# Patient Record
Sex: FEMALE | Race: WHITE | NOT HISPANIC OR LATINO | Employment: FULL TIME | URBAN - METROPOLITAN AREA
[De-identification: names, ages, dates, MRNs, and addresses within clinical notes are randomized per-mention and may not be internally consistent; named-entity substitution may affect disease eponyms.]

---

## 2019-10-16 LAB
HCT VFR BLD AUTO: 41 % (ref 36–46)
HGB BLD-MCNC: 13 G/DL (ref 12–16)
MCV RBC AUTO: 96 FL (ref 82–108)
PLATELET # BLD AUTO: 266 K/?L (ref 150–399)
TSH SERPL DL<=0.005 MIU/L-ACNC: 0.86 UIU/ML (ref 0.41–5.9)

## 2019-10-17 LAB
HBV SURFACE AG SERPL QL IA: NEGATIVE
RPR: NORMAL
RUBELLA IMMUNE STATUS: NORMAL

## 2019-10-18 LAB — HIV-1 AND HIV-2 ANTIBODIES: NORMAL

## 2019-10-21 LAB
C TRACH RRNA SPEC QL PROBE: NEGATIVE
N GONORRHOEAE, AMPLIFIED DNA: NEGATIVE

## 2019-11-21 ENCOUNTER — INITIAL PRENATAL (OUTPATIENT)
Dept: OBSTETRICS AND GYNECOLOGY | Facility: CLINIC | Age: 24
End: 2019-11-21
Payer: COMMERCIAL

## 2019-11-21 ENCOUNTER — TELEPHONE (OUTPATIENT)
Dept: MATERNAL FETAL MEDICINE | Facility: CLINIC | Age: 24
End: 2019-11-21

## 2019-11-21 VITALS — SYSTOLIC BLOOD PRESSURE: 116 MMHG | WEIGHT: 130.06 LBS | DIASTOLIC BLOOD PRESSURE: 64 MMHG

## 2019-11-21 DIAGNOSIS — Z34.90 PREGNANT AND NOT YET DELIVERED, UNSPECIFIED TRIMESTER: Primary | ICD-10-CM

## 2019-11-21 PROCEDURE — 99999 PR PBB SHADOW E&M-NEW PATIENT-LVL II: ICD-10-PCS | Mod: PBBFAC,,, | Performed by: ADVANCED PRACTICE MIDWIFE

## 2019-11-21 PROCEDURE — 0500F PR INITIAL PRENATAL CARE VISIT: ICD-10-PCS | Mod: S$GLB,,, | Performed by: ADVANCED PRACTICE MIDWIFE

## 2019-11-21 PROCEDURE — 87086 URINE CULTURE/COLONY COUNT: CPT

## 2019-11-21 PROCEDURE — 0500F INITIAL PRENATAL CARE VISIT: CPT | Mod: S$GLB,,, | Performed by: ADVANCED PRACTICE MIDWIFE

## 2019-11-21 PROCEDURE — 99999 PR PBB SHADOW E&M-NEW PATIENT-LVL II: CPT | Mod: PBBFAC,,, | Performed by: ADVANCED PRACTICE MIDWIFE

## 2019-11-21 RX ORDER — TERCONAZOLE 8 MG/G
CREAM VAGINAL
COMMUNITY
Start: 2019-10-24 | End: 2020-01-14 | Stop reason: ALTCHOICE

## 2019-11-21 NOTE — PROGRESS NOTES
LIZETTEM offered pt emergency NT @ 0840 tomorrow morning but pt unable to keep appt. She'd prefer Quad screen instead and knows it's optimally drawn from 16-18 weeks. Ordered.     Cinthya

## 2019-11-21 NOTE — TELEPHONE ENCOUNTER
Nedra with ABC came to [facility] reporting a NT ordered has just been placed for a pt who is 13 weeks 2 days. Schedule full. Reviewed information with Dr Velazquez who reports pt can be added tomorrow morning and recommends provider off. Called [facility], staff offered pt appt tomorrow morning 8:40 am. Pt declined. Order removed

## 2019-11-21 NOTE — PROGRESS NOTES
No chief complaint on file.      24 y.o.,  at 13w1d by LMP and dating scan according to pt report (records requested).     Patient presents today for a transfer initial prenatal visit. She reports she has been receiving regular, routine prenatal care at [facility].  Patient has not completed a Meet & Greet tour of [facility].  She is here today with her mother.    Doing well overall.     SOCIAL HISTORY: Denies emotional/mental/physical/sexual violence or abuse. Feels safe at home.     Please ask about prepregnancy weight at next visit.       ROS  OBSTETRICS:   Contractions No   Bleeding No   Loss of fluid No   Fetal mvmnt:   +    GASTRO:   Nausea No   Vomiting No      OB History    Para Term  AB Living   1             SAB TAB Ectopic Multiple Live Births                  # Outcome Date GA Lbr Frank/2nd Weight Sex Delivery Anes PTL Lv   1 Current                Dating reviewed  Allergies and problem list reviewed and updated  Medical and surgical history reviewed    PHYSICAL EXAM  /64   Wt 59 kg (130 lb 1.1 oz)     GENERAL: No acute distress  HEENT: Normocephalic, atruamatic  NEURO: Alert and oriented x3  PSYCH: Calm, normal mood and affect  PULMONARY: Non-labored respiration; no tachypnea  ABD: Soft, gravid, nontender    ASSESSMENT AND PLAN    ARIADNA MATUTE Problems (from 19 to present)     No problems associated with this episode.            Patient does not have copy of prenatal records here with her today. Records request completed, will fax today.  Awaiting initial labs and dating u/s.  Counseled today on proper weight gain based on the [place] of [employer]'s recommendations based on her pre-pregnancy weight. Discussed excessive weight gain allowance, which would risk her out of the ABC (and would plan for delivery on L&D), but not midwifery care. Reviewed potential risks to excessive weight gain.  .BMI (prepregnancy) Normal weight -   -- Discussed IOM recommended weight gain  of:   Weight category Pre pre BMI  Recommended TWG   Underweight Less than 18.5 28-40    Normal Weight 18.5-24.9  25-35    Overweight 25-29.9  15-25    Obese   30 and greater  11-20   -- Discussed criteria for delivery at [facility] r/t excessive pre-preg weight or excessive weight gain:   Pre-pregnancy BMI over 40 or excess pregnancy weight gain defined as:   Pre-preg BMI < 18.5; Excess weight gain = > 60 pound   Pre-preg BMI 18.5-24.9;  Excess weight gain = > 53 pounds   Pre-preg BMI 25-29.9;  Excess weight gain = > 38 pounds   Pre-preg BMI > 30;  Excess weight gain = > 30 pounds    Review exercise precautions in pregnancy, along with recommendations. She does exercise regularly.   Discussed substances & foods to avoid in pregnancy (i.e. sushi, fish that are high in mercury, deli meat, and unpasteurized cheeses).   Discussed prenatal vitamin options (i.e. stool softener, DHA).    Patient was oriented to practice and progression of routine prenatal care.   Reviewed New OB Pregnancy packet & questions answered.    Reviewed aneuploidy screening options and indications, questions answered - patient desires Sequential Screen. Went to [facility] to request stat booking of NT - awaiting phone call and Tootie will call and give pt appt. Please ask about [place] screening at next visit.   Education regarding warning signs.      Follow up: 4 wks, call or present sooner prn.

## 2019-11-22 LAB — BACTERIA UR CULT: NO GROWTH

## 2019-11-26 ENCOUNTER — TELEPHONE (OUTPATIENT)
Dept: OBSTETRICS AND GYNECOLOGY | Facility: CLINIC | Age: 24
End: 2019-11-26

## 2019-11-26 NOTE — TELEPHONE ENCOUNTER
Returned pt call to reschedule appointment reached  left pt message advising to return call to clinic           ----- Message from Aileen Amor sent at 11/26/2019 12:39 PM CST -----  Contact: ARIADNA MATUTE [[id number]]  Name of Who is Calling: ARIADNA MATUTE [[id number]]      What is the request in detail:   Patient called requesting to reschedule her appointment. I did attempt to reschedule per request but was unsuccessful.   Please give a call back at your earliest convenience and further advise.     THANKS!      Reply by [facility]: NO      Call Back: ARIADNA MATUTE / # [phone]

## 2019-12-17 ENCOUNTER — ROUTINE PRENATAL (OUTPATIENT)
Dept: OBSTETRICS AND GYNECOLOGY | Facility: CLINIC | Age: 24
End: 2019-12-17
Payer: COMMERCIAL

## 2019-12-17 VITALS
DIASTOLIC BLOOD PRESSURE: 64 MMHG | BODY MASS INDEX: 20.99 KG/M2 | SYSTOLIC BLOOD PRESSURE: 106 MMHG | HEIGHT: 66 IN | WEIGHT: 130.63 LBS

## 2019-12-17 DIAGNOSIS — Z13.9 RISK AND FUNCTIONAL ASSESSMENT: ICD-10-CM

## 2019-12-17 DIAGNOSIS — Z34.90 PREGNANCY, UNSPECIFIED GESTATIONAL AGE: Primary | ICD-10-CM

## 2019-12-17 PROCEDURE — 99999 PR PBB SHADOW E&M-EST. PATIENT-LVL III: CPT | Mod: PBBFAC,,, | Performed by: ADVANCED PRACTICE MIDWIFE

## 2019-12-17 PROCEDURE — 0502F PR SUBSEQUENT PRENATAL CARE: ICD-10-PCS | Mod: CPTII,S$GLB,, | Performed by: ADVANCED PRACTICE MIDWIFE

## 2019-12-17 PROCEDURE — 99999 PR PBB SHADOW E&M-EST. PATIENT-LVL III: ICD-10-PCS | Mod: PBBFAC,,, | Performed by: ADVANCED PRACTICE MIDWIFE

## 2019-12-17 PROCEDURE — 0502F SUBSEQUENT PRENATAL CARE: CPT | Mod: CPTII,S$GLB,, | Performed by: ADVANCED PRACTICE MIDWIFE

## 2019-12-23 ENCOUNTER — DOCUMENTATION ONLY (OUTPATIENT)
Dept: OBSTETRICS AND GYNECOLOGY | Facility: CLINIC | Age: 24
End: 2019-12-23

## 2019-12-23 LAB
HCV AB SER-ACNC: NEGATIVE
PAP SMEAR: ABNORMAL

## 2020-01-08 ENCOUNTER — OFFICE VISIT (OUTPATIENT)
Dept: OBSTETRICS AND GYNECOLOGY | Facility: CLINIC | Age: 25
End: 2020-01-08
Payer: COMMERCIAL

## 2020-01-08 VITALS
WEIGHT: 134.06 LBS | HEIGHT: 66 IN | SYSTOLIC BLOOD PRESSURE: 118 MMHG | BODY MASS INDEX: 21.55 KG/M2 | DIASTOLIC BLOOD PRESSURE: 72 MMHG

## 2020-01-08 DIAGNOSIS — R87.612 LGSIL ON PAP SMEAR OF CERVIX: ICD-10-CM

## 2020-01-08 PROCEDURE — 99499 UNLISTED E&M SERVICE: CPT | Mod: S$GLB,,, | Performed by: OBSTETRICS & GYNECOLOGY

## 2020-01-08 PROCEDURE — 99999 PR PBB SHADOW E&M-EST. PATIENT-LVL III: ICD-10-PCS | Mod: PBBFAC,,, | Performed by: OBSTETRICS & GYNECOLOGY

## 2020-01-08 PROCEDURE — 57452 COLPOSCOPY: ICD-10-PCS | Mod: S$GLB,,, | Performed by: OBSTETRICS & GYNECOLOGY

## 2020-01-08 PROCEDURE — 99499 NO LOS: ICD-10-PCS | Mod: S$GLB,,, | Performed by: OBSTETRICS & GYNECOLOGY

## 2020-01-08 PROCEDURE — 99999 PR PBB SHADOW E&M-EST. PATIENT-LVL III: CPT | Mod: PBBFAC,,, | Performed by: OBSTETRICS & GYNECOLOGY

## 2020-01-08 PROCEDURE — 57452 EXAM OF CERVIX W/SCOPE: CPT | Mod: S$GLB,,, | Performed by: OBSTETRICS & GYNECOLOGY

## 2020-01-08 NOTE — PROCEDURES
Colposcopy  Date/Time: 1/8/2020 3:44 PM  Performed by: Abisai Streeter Jr., MD  Authorized by: Abisai Streeter Jr., MD     Consent Done?:  Yes (Written)  Assistants?: No      Colposcopy Site:  Cervix  Position:  Supine  Acrowhite Lesion: No    Atypical Vessels: No    Transformation Zone Adequate?: Yes    Biopsy?: No    ECC Performed?: No    LEEP Performed?: No    Estimated blood loss (cc):  0   Patient tolerated the procedure well with no immediate complications.

## 2020-01-09 ENCOUNTER — PROCEDURE VISIT (OUTPATIENT)
Dept: MATERNAL FETAL MEDICINE | Facility: CLINIC | Age: 25
End: 2020-01-09
Payer: COMMERCIAL

## 2020-01-09 DIAGNOSIS — Z36.89 ENCOUNTER FOR FETAL ANATOMIC SURVEY: ICD-10-CM

## 2020-01-09 DIAGNOSIS — Z34.90 PREGNANCY, UNSPECIFIED GESTATIONAL AGE: ICD-10-CM

## 2020-01-09 PROCEDURE — 76805 OB US >/= 14 WKS SNGL FETUS: CPT | Mod: S$GLB,,, | Performed by: OBSTETRICS & GYNECOLOGY

## 2020-01-09 PROCEDURE — 76805 PR US, OB 14+WKS, TRANSABD, SINGLE GESTATION: ICD-10-PCS | Mod: S$GLB,,, | Performed by: OBSTETRICS & GYNECOLOGY

## 2020-01-14 ENCOUNTER — ROUTINE PRENATAL (OUTPATIENT)
Dept: OBSTETRICS AND GYNECOLOGY | Facility: CLINIC | Age: 25
End: 2020-01-14
Payer: COMMERCIAL

## 2020-01-14 VITALS — WEIGHT: 130.75 LBS | SYSTOLIC BLOOD PRESSURE: 102 MMHG | BODY MASS INDEX: 21.1 KG/M2 | DIASTOLIC BLOOD PRESSURE: 60 MMHG

## 2020-01-14 DIAGNOSIS — Z13.9 RISK AND FUNCTIONAL ASSESSMENT: Primary | ICD-10-CM

## 2020-01-14 PROCEDURE — 99999 PR PBB SHADOW E&M-EST. PATIENT-LVL II: ICD-10-PCS | Mod: PBBFAC,,, | Performed by: ADVANCED PRACTICE MIDWIFE

## 2020-01-14 PROCEDURE — 0502F SUBSEQUENT PRENATAL CARE: CPT | Mod: CPTII,S$GLB,, | Performed by: ADVANCED PRACTICE MIDWIFE

## 2020-01-14 PROCEDURE — 99999 PR PBB SHADOW E&M-EST. PATIENT-LVL II: CPT | Mod: PBBFAC,,, | Performed by: ADVANCED PRACTICE MIDWIFE

## 2020-01-14 PROCEDURE — 0502F PR SUBSEQUENT PRENATAL CARE: ICD-10-PCS | Mod: CPTII,S$GLB,, | Performed by: ADVANCED PRACTICE MIDWIFE

## 2020-01-14 NOTE — PROGRESS NOTES
24 y.o., at 21w0d by Estimated Date of Delivery: 20    Doing well.    TWG: 3 lbs    BMI   -- Discussed IOM recommended weight gain of:   Normal Weight 18.5-24.9  25-35   -- Discussed criteria for delivery at [facility] r/t excessive pre-preg weight or excessive weight gain:   Pre-pregnancy BMI over 40 or excess pregnancy weight gain defined as:   Pre-preg BMI 18.5-24.9;  Excess weight gain = > 53 pounds      ROS  OBSTETRICS:   Contractions No   Bleeding No   Loss of fluid No   Fetal mvmnt   Yes  GASTRO:   Nausea No   Vomiting No      OB History    Para Term  AB Living   1             SAB TAB Ectopic Multiple Live Births                  # Outcome Date GA Lbr Frank/2nd Weight Sex Delivery Anes PTL Lv   1 Current                Dating reviewed  Allergies and problem list reviewed and updated  Medical and surgical history reviewed  Prenatal labs reviewed and updated    PHYSICAL EXAM  LMP 2019     GENERAL: No acute distress  HEENT: Normocephalic, atraumatic  NEURO: Alert and oriented x3  PSYCH: Normal mood and affect  PULMONARY: Non-labored respiration; no tachypnea  ABD: Soft, gravid, nontender; no hernia or hepatosplenomegaly  FH = umbilicus    ASSESSMENT AND PLAN    ARIADNA MATUTE Problems (from 19 to present)     No problems associated with this episode.            Reviewed anatomy ultrasound    Reviewed wt gain parameters for ABC, along with exercise/diet recommendations in pregnancy.    Encouraged prenatal education classes    Education regarding  labor warning s/s.  Confirmed after-hours number given to patient.    Patient still needs a Type & Screen. Discussed this with patient and she agrees to have this drawn. It is ordered and I discussed with her how she can get this drawn. She also needs a GC to be collected. Please discuss this with patient at next visit.    Awaiting first trimester US results from [facility].    Reviewed warning signs, normal FM, and how/when to call.    Follow-up:  4 weeks, call or present sooner PRN

## 2020-02-11 ENCOUNTER — LAB VISIT (OUTPATIENT)
Dept: LAB | Facility: OTHER | Age: 25
End: 2020-02-11
Attending: ADVANCED PRACTICE MIDWIFE
Payer: COMMERCIAL

## 2020-02-11 ENCOUNTER — ROUTINE PRENATAL (OUTPATIENT)
Dept: OBSTETRICS AND GYNECOLOGY | Facility: CLINIC | Age: 25
End: 2020-02-11
Payer: COMMERCIAL

## 2020-02-11 VITALS
DIASTOLIC BLOOD PRESSURE: 64 MMHG | SYSTOLIC BLOOD PRESSURE: 110 MMHG | WEIGHT: 138.25 LBS | BODY MASS INDEX: 22.31 KG/M2

## 2020-02-11 DIAGNOSIS — Z34.90 PREGNANCY, UNSPECIFIED GESTATIONAL AGE: ICD-10-CM

## 2020-02-11 DIAGNOSIS — Z34.90 PREGNANCY, UNSPECIFIED GESTATIONAL AGE: Primary | ICD-10-CM

## 2020-02-11 LAB
ABO + RH BLD: NORMAL
BLD GP AB SCN CELLS X3 SERPL QL: NORMAL

## 2020-02-11 PROCEDURE — 0502F SUBSEQUENT PRENATAL CARE: CPT | Mod: CPTII,S$GLB,, | Performed by: ADVANCED PRACTICE MIDWIFE

## 2020-02-11 PROCEDURE — 0502F PR SUBSEQUENT PRENATAL CARE: ICD-10-PCS | Mod: CPTII,S$GLB,, | Performed by: ADVANCED PRACTICE MIDWIFE

## 2020-02-11 PROCEDURE — 36415 COLL VENOUS BLD VENIPUNCTURE: CPT

## 2020-02-11 PROCEDURE — 99999 PR PBB SHADOW E&M-EST. PATIENT-LVL II: ICD-10-PCS | Mod: PBBFAC,,, | Performed by: ADVANCED PRACTICE MIDWIFE

## 2020-02-11 PROCEDURE — 99999 PR PBB SHADOW E&M-EST. PATIENT-LVL II: CPT | Mod: PBBFAC,,, | Performed by: ADVANCED PRACTICE MIDWIFE

## 2020-02-11 PROCEDURE — 86901 BLOOD TYPING SEROLOGIC RH(D): CPT

## 2020-02-11 NOTE — PROGRESS NOTES
Chief Complaint   Patient presents with    Routine Prenatal Visit       24 y.o. female  at 25w0d, by Estimated Date of Delivery: 20    Complaints today: Manuel here. Doing well today.   Reviewed TW lbs    ROS  OBSTETRICS:   Contractions No   Bleeding No   Loss of fluid No   Fetal mvmnt yes  GASTRO:   Nausea No   Vomiting No      OB History    Para Term  AB Living   1             SAB TAB Ectopic Multiple Live Births                  # Outcome Date GA Lbr Frank/2nd Weight Sex Delivery Anes PTL Lv   1 Current                Dating reviewed  Allergies and problem list reviewed and updated  Medical and surgical history reviewed  Prenatal labs reviewed and updated    PHYSICAL EXAM  /64   Wt 62.7 kg (138 lb 3.7 oz)   LMP 2019   BMI 22.31 kg/m²     GENERAL: No acute distress  HEENT: Normocephalic, atraumatic  NEURO: Alert and oriented x3  PSYCH: Normal mood and affect  PULMONARY: Non-labored respiration; no tachypnea  ABD: Soft, gravid, nontender      ASSESSMENT AND PLAN    ARIADNA MATUTE Problems (from 19 to present)     No problems associated with this episode.            Reviewed upcoming 28wk labs, and orders placed  Education regarding warning signs of PreEclampsia, reviewed normal FM,  labor precautions, and how/when to call.    Follow-up: 4 weeks, call or present sooner PRN

## 2020-02-27 ENCOUNTER — ROUTINE PRENATAL (OUTPATIENT)
Dept: OBSTETRICS AND GYNECOLOGY | Facility: CLINIC | Age: 25
End: 2020-02-27
Payer: COMMERCIAL

## 2020-02-27 ENCOUNTER — LAB VISIT (OUTPATIENT)
Dept: LAB | Facility: OTHER | Age: 25
End: 2020-02-27
Attending: ADVANCED PRACTICE MIDWIFE
Payer: COMMERCIAL

## 2020-02-27 VITALS
SYSTOLIC BLOOD PRESSURE: 120 MMHG | DIASTOLIC BLOOD PRESSURE: 72 MMHG | WEIGHT: 139.69 LBS | BODY MASS INDEX: 22.54 KG/M2

## 2020-02-27 DIAGNOSIS — Z34.90 PREGNANCY, UNSPECIFIED GESTATIONAL AGE: ICD-10-CM

## 2020-02-27 DIAGNOSIS — Z34.93 PRENATAL CARE IN THIRD TRIMESTER: Primary | ICD-10-CM

## 2020-02-27 LAB — GLUCOSE SERPL-MCNC: 114 MG/DL (ref 70–140)

## 2020-02-27 PROCEDURE — 99999 PR PBB SHADOW E&M-EST. PATIENT-LVL II: CPT | Mod: PBBFAC,,, | Performed by: ADVANCED PRACTICE MIDWIFE

## 2020-02-27 PROCEDURE — 36415 COLL VENOUS BLD VENIPUNCTURE: CPT

## 2020-02-27 PROCEDURE — 0502F SUBSEQUENT PRENATAL CARE: CPT | Mod: CPTII,S$GLB,, | Performed by: ADVANCED PRACTICE MIDWIFE

## 2020-02-27 PROCEDURE — 0502F PR SUBSEQUENT PRENATAL CARE: ICD-10-PCS | Mod: CPTII,S$GLB,, | Performed by: ADVANCED PRACTICE MIDWIFE

## 2020-02-27 PROCEDURE — 82950 GLUCOSE TEST: CPT

## 2020-02-27 PROCEDURE — 99999 PR PBB SHADOW E&M-EST. PATIENT-LVL II: ICD-10-PCS | Mod: PBBFAC,,, | Performed by: ADVANCED PRACTICE MIDWIFE

## 2020-02-27 NOTE — PROGRESS NOTES
No chief complaint on file.      24 y.o. female  at 27w2d, by Estimated Date of Delivery: 20    Complaints today: none. Doing well today.   Reviewed TW lbs    ROS  OBSTETRICS:   Contractions No   Bleeding No   Loss of fluid No   Fetal mvmnt yes  GASTRO:   Nausea No   Vomiting No      OB History    Para Term  AB Living   1             SAB TAB Ectopic Multiple Live Births                  # Outcome Date GA Lbr Frank/2nd Weight Sex Delivery Anes PTL Lv   1 Current                Dating reviewed  Allergies and problem list reviewed and updated  Medical and surgical history reviewed  Prenatal labs reviewed and updated    PHYSICAL EXAM  /72   Wt 63.3 kg (139 lb 10.6 oz)   LMP 2019   BMI 22.54 kg/m²     GENERAL: No acute distress  HEENT: Normocephalic, atraumatic  NEURO: Alert and oriented x3  PSYCH: Normal mood and affect  PULMONARY: Non-labored respiration; no tachypnea  ABD: Soft, gravid, nontender      ASSESSMENT AND PLAN    ARIADNA MATUTE Problems (from 19 to present)     No problems associated with this episode.            Reviewed upcoming 28wk labs, (A POS) to be drawn today. No need for rhogam  Education regarding warning signs of PreEclampsia, reviewed normal FM,  labor precautions, and how/when to call.    Follow-up: 4 weeks, call or present sooner MARIA VICTORIA Plascencia CNM, MSN  2020  11:39 AM

## 2020-03-12 ENCOUNTER — ROUTINE PRENATAL (OUTPATIENT)
Dept: OBSTETRICS AND GYNECOLOGY | Facility: CLINIC | Age: 25
End: 2020-03-12
Payer: COMMERCIAL

## 2020-03-12 VITALS
BODY MASS INDEX: 24.52 KG/M2 | WEIGHT: 151.88 LBS | SYSTOLIC BLOOD PRESSURE: 116 MMHG | DIASTOLIC BLOOD PRESSURE: 64 MMHG

## 2020-03-12 DIAGNOSIS — Z3A.29 29 WEEKS GESTATION OF PREGNANCY: Primary | ICD-10-CM

## 2020-03-12 PROCEDURE — 0502F PR SUBSEQUENT PRENATAL CARE: ICD-10-PCS | Mod: CPTII,S$GLB,, | Performed by: ADVANCED PRACTICE MIDWIFE

## 2020-03-12 PROCEDURE — 99999 PR PBB SHADOW E&M-EST. PATIENT-LVL II: CPT | Mod: PBBFAC,,, | Performed by: ADVANCED PRACTICE MIDWIFE

## 2020-03-12 PROCEDURE — 0502F SUBSEQUENT PRENATAL CARE: CPT | Mod: CPTII,S$GLB,, | Performed by: ADVANCED PRACTICE MIDWIFE

## 2020-03-12 PROCEDURE — 99999 PR PBB SHADOW E&M-EST. PATIENT-LVL II: ICD-10-PCS | Mod: PBBFAC,,, | Performed by: ADVANCED PRACTICE MIDWIFE

## 2020-03-12 NOTE — PROGRESS NOTES
No chief complaint on file.      24 y.o. female  at 29w2d, by Estimated Date of Delivery: 20    Complaints today: no. Doing well today.  Reviewed TW lbs    ROS  OBSTETRICS:   Contractions No   Bleeding No   Loss of fluid No   Fetal mvmnt pos  GASTRO:   Nausea No   Vomiting No      OB History    Para Term  AB Living   1             SAB TAB Ectopic Multiple Live Births                  # Outcome Date GA Lbr Frank/2nd Weight Sex Delivery Anes PTL Lv   1 Current                Dating reviewed  Allergies and problem list reviewed and updated  Medical and surgical history reviewed  Prenatal labs reviewed and updated    PHYSICAL EXAM  /64   Wt 68.9 kg (151 lb 14.4 oz)   LMP 2019   BMI 24.52 kg/m²     GENERAL: No acute distress  HEENT: Normocephalic, atraumatic  NEURO: Alert and oriented x3  PSYCH: Normal mood and affect  PULMONARY: Non-labored respiration; no tachypnea  ABD: Soft, gravid, nontender.      ASSESSMENT AND PLAN    ARIADNA MATUTE Problems (from 19 to present)     No problems associated with this episode.          Patient plans to breast feed - reviewed breast feeding class here.  It's a GIrl!     Reviewed warning signs, normal FM,  labor precautions, and how/when to call.    Confirmed pt has after-hours number.  IUD pregnancy: pregnant year 3 on Aleida so UNSURE about contraceptives but may consider another IUD?  Follow-up: 2 weeks, call or present sooner MARIA VICTORIA Plascencia CNM

## 2020-03-17 ENCOUNTER — DOCUMENTATION ONLY (OUTPATIENT)
Dept: OBSTETRICS AND GYNECOLOGY | Facility: CLINIC | Age: 25
End: 2020-03-17

## 2020-03-17 NOTE — PROGRESS NOTES
Received records from [facility] 1st trimester US report listed and noted on prenatal record.

## 2020-03-18 ENCOUNTER — PATIENT MESSAGE (OUTPATIENT)
Dept: ADMINISTRATIVE | Facility: OTHER | Age: 25
End: 2020-03-18

## 2020-03-26 ENCOUNTER — ROUTINE PRENATAL (OUTPATIENT)
Dept: OBSTETRICS AND GYNECOLOGY | Facility: CLINIC | Age: 25
End: 2020-03-26
Payer: COMMERCIAL

## 2020-03-26 VITALS
BODY MASS INDEX: 23.22 KG/M2 | WEIGHT: 143.88 LBS | SYSTOLIC BLOOD PRESSURE: 112 MMHG | DIASTOLIC BLOOD PRESSURE: 70 MMHG

## 2020-03-26 DIAGNOSIS — Z34.93 PRENATAL CARE IN THIRD TRIMESTER: Primary | ICD-10-CM

## 2020-03-26 DIAGNOSIS — Z34.90 PREGNANCY, UNSPECIFIED GESTATIONAL AGE: ICD-10-CM

## 2020-03-26 PROCEDURE — 0502F SUBSEQUENT PRENATAL CARE: CPT | Mod: CPTII,S$GLB,, | Performed by: ADVANCED PRACTICE MIDWIFE

## 2020-03-26 PROCEDURE — 99999 PR PBB SHADOW E&M-EST. PATIENT-LVL III: CPT | Mod: PBBFAC,,, | Performed by: ADVANCED PRACTICE MIDWIFE

## 2020-03-26 PROCEDURE — 0502F PR SUBSEQUENT PRENATAL CARE: ICD-10-PCS | Mod: CPTII,S$GLB,, | Performed by: ADVANCED PRACTICE MIDWIFE

## 2020-03-26 PROCEDURE — 99999 PR PBB SHADOW E&M-EST. PATIENT-LVL III: ICD-10-PCS | Mod: PBBFAC,,, | Performed by: ADVANCED PRACTICE MIDWIFE

## 2020-03-26 NOTE — PROGRESS NOTES
Chief Complaint   Patient presents with    Routine Prenatal Visit       24 y.o. female  at 31w2d, by Estimated Date of Delivery: 20    Complaints today: . Doing well today.  Reviewed TW lbs    ROS  OBSTETRICS:   Contractions No   Bleeding No   Loss of fluid No   Fetal mvmnt present  GASTRO:   Nausea No   Vomiting No  .Erica Plascencia CNM, MSN  2020  12:09 PM        OB History    Para Term  AB Living   1             SAB TAB Ectopic Multiple Live Births                  # Outcome Date GA Lbr Frank/2nd Weight Sex Delivery Anes PTL Lv   1 Current                Dating reviewed  Allergies and problem list reviewed and updated  Medical and surgical history reviewed  Prenatal labs reviewed and updated    PHYSICAL EXAM  LMP 2019     GENERAL: No acute distress  HEENT: Normocephalic, atraumatic  NEURO: Alert and oriented x3  PSYCH: Normal mood and affect  PULMONARY: Non-labored respiration; no tachypnea  ABD: Soft, gravid, nontender.      ASSESSMENT AND PLAN    ARIADNA MATUTE Problems (from 19 to present)     No problems associated with this episode.          Patient  plans to breast feed - reviewed breast feeding class here.  It's a GIRL! Pediatrician: Nancy (sp?) her ped as child  Desires natural childbirth  Reviewed warning signs, normal FM,  labor precautions, and how/when to call.  Confirmed pt has after-hours number.  Postpartum contraception  Covid-19 policies, procedures and precautions reviewed with patient and advised they are always subject to change.  Emergency phone numbers reviewed as well.  Covid-19 policies, procedures and precautions reviewed with patient and advised they are always subject to change.  Emergency phone numbers reviewed as well.    Follow-up: 2 weeks, call or present sooner PRN  Erica Plascencia CNM, MSN  2020  11:26 AM

## 2020-03-30 ENCOUNTER — IMMUNIZATION (OUTPATIENT)
Dept: PHARMACY | Facility: CLINIC | Age: 25
End: 2020-03-30
Payer: COMMERCIAL

## 2020-04-09 ENCOUNTER — ROUTINE PRENATAL (OUTPATIENT)
Dept: OBSTETRICS AND GYNECOLOGY | Facility: CLINIC | Age: 25
End: 2020-04-09
Payer: COMMERCIAL

## 2020-04-09 VITALS — DIASTOLIC BLOOD PRESSURE: 74 MMHG | SYSTOLIC BLOOD PRESSURE: 118 MMHG | WEIGHT: 145.5 LBS | BODY MASS INDEX: 23.48 KG/M2

## 2020-04-09 DIAGNOSIS — Z13.9 RISK AND FUNCTIONAL ASSESSMENT: ICD-10-CM

## 2020-04-09 DIAGNOSIS — Z34.90 PREGNANCY, UNSPECIFIED GESTATIONAL AGE: Primary | ICD-10-CM

## 2020-04-09 PROCEDURE — 99999 PR PBB SHADOW E&M-EST. PATIENT-LVL III: CPT | Mod: PBBFAC,,, | Performed by: ADVANCED PRACTICE MIDWIFE

## 2020-04-09 PROCEDURE — 87491 CHLMYD TRACH DNA AMP PROBE: CPT

## 2020-04-09 PROCEDURE — 0502F PR SUBSEQUENT PRENATAL CARE: ICD-10-PCS | Mod: CPTII,S$GLB,, | Performed by: ADVANCED PRACTICE MIDWIFE

## 2020-04-09 PROCEDURE — 99999 PR PBB SHADOW E&M-EST. PATIENT-LVL III: ICD-10-PCS | Mod: PBBFAC,,, | Performed by: ADVANCED PRACTICE MIDWIFE

## 2020-04-09 PROCEDURE — 0502F SUBSEQUENT PRENATAL CARE: CPT | Mod: CPTII,S$GLB,, | Performed by: ADVANCED PRACTICE MIDWIFE

## 2020-04-09 NOTE — PROGRESS NOTES
Chief Complaint   Patient presents with    Routine Prenatal Visit     24 y.o. female  at 33w2d, by Estimated Date of Delivery: 20    Doing well today.  Reviewed TW lbs    BMI  -- Discussed IOM recommended weight gain of:   Normal Weight 18.5-24.9  25-35   -- Discussed criteria for delivery at [facility] r/t excessive pre-preg weight or excessive weight gain:   Pre-pregnancy BMI over 40 or excess pregnancy weight gain defined as:   Pre-preg BMI 18.5-24.9;  Excess weight gain = > 53 pounds    ROS  OBSTETRICS:   Contractions No   Bleeding No   Loss of fluid No   Fetal mvmnt Yes  GASTRO:   Nausea No   Vomiting No      OB History    Para Term  AB Living   1             SAB TAB Ectopic Multiple Live Births                  # Outcome Date GA Lbr Frank/2nd Weight Sex Delivery Anes PTL Lv   1 Current                Dating reviewed  Allergies and problem list reviewed and updated  Medical and surgical history reviewed  Prenatal labs reviewed and updated    PHYSICAL EXAM  /74   Wt 66 kg (145 lb 8.1 oz)   LMP 2019   BMI 23.48 kg/m²     GENERAL: No acute distress  HEENT: Normocephalic, atraumatic  NEURO: Alert and oriented x3  PSYCH: Normal mood and affect  PULMONARY: Non-labored respiration; no tachypnea  ABD: Soft, gravid, nontender.      ASSESSMENT AND PLAN    ARIADNA GIOVANI Problems (from 19 to present)     No problems associated with this episode.          Reviewed upcoming 36wk labs (GBS, HIV, RPR, CBC).  Reviewed patient does not have a history of genital HSV.     Reviewed ABC risk assessment with the patient:    Birth Center Risk Assessment: 0- Meets birth center guidelines    0- CNM management in ABC  1- CNM management on L&D  2- Consultation with OB to develop  plan of care  3- Collaborative CNM/OB management with delivery on L&D  4- Permanent referral of care to MD    She understands she is a candidate for the ABC. Reviewed potential risks which could arise, that could change this  status.    Discussed that the ABC is closed during COVID-19 outbreak, so all CNM patients will labor and give birth on L&D unit until further notice. Patient verbalized understanding.    Patient is interested in signing up for [employer] [facility]. Discussed what that is, answered all questions, and triggered the order set.    Reviewed warning signs, normal FM,  labor precautions, and how/when to call. Confirmed pt has after-hours number.    Follow-up: 2 weeks, call or present sooner PRN

## 2020-04-14 LAB
C TRACH DNA SPEC QL NAA+PROBE: NOT DETECTED
N GONORRHOEA DNA SPEC QL NAA+PROBE: NOT DETECTED

## 2020-04-20 PROBLEM — Z13.9 RISK AND FUNCTIONAL ASSESSMENT: Status: RESOLVED | Noted: 2019-12-17 | Resolved: 2020-04-20

## 2020-04-23 ENCOUNTER — OFFICE VISIT (OUTPATIENT)
Dept: OBSTETRICS AND GYNECOLOGY | Facility: CLINIC | Age: 25
End: 2020-04-23
Payer: COMMERCIAL

## 2020-04-23 VITALS — BODY MASS INDEX: 23.89 KG/M2 | DIASTOLIC BLOOD PRESSURE: 69 MMHG | SYSTOLIC BLOOD PRESSURE: 116 MMHG | WEIGHT: 148 LBS

## 2020-04-23 DIAGNOSIS — Z3A.35 35 WEEKS GESTATION OF PREGNANCY: Primary | ICD-10-CM

## 2020-04-23 DIAGNOSIS — Z34.93 PRENATAL CARE IN THIRD TRIMESTER: ICD-10-CM

## 2020-04-23 PROCEDURE — 0502F SUBSEQUENT PRENATAL CARE: CPT | Mod: CPTII,,, | Performed by: NURSE PRACTITIONER

## 2020-04-23 PROCEDURE — 0502F PR SUBSEQUENT PRENATAL CARE: ICD-10-PCS | Mod: CPTII,,, | Performed by: NURSE PRACTITIONER

## 2020-04-29 ENCOUNTER — LAB VISIT (OUTPATIENT)
Dept: LAB | Facility: OTHER | Age: 25
End: 2020-04-29
Attending: ADVANCED PRACTICE MIDWIFE
Payer: COMMERCIAL

## 2020-04-29 ENCOUNTER — ROUTINE PRENATAL (OUTPATIENT)
Dept: OBSTETRICS AND GYNECOLOGY | Facility: CLINIC | Age: 25
End: 2020-04-29
Payer: COMMERCIAL

## 2020-04-29 VITALS
BODY MASS INDEX: 24.16 KG/M2 | WEIGHT: 149.69 LBS | DIASTOLIC BLOOD PRESSURE: 68 MMHG | SYSTOLIC BLOOD PRESSURE: 106 MMHG

## 2020-04-29 DIAGNOSIS — Z34.90 PREGNANCY, UNSPECIFIED GESTATIONAL AGE: ICD-10-CM

## 2020-04-29 DIAGNOSIS — Z34.03 ENCOUNTER FOR SUPERVISION OF NORMAL FIRST PREGNANCY IN THIRD TRIMESTER: Primary | ICD-10-CM

## 2020-04-29 LAB
BASOPHILS # BLD AUTO: 0.02 K/UL (ref 0–0.2)
BASOPHILS NFR BLD: 0.2 % (ref 0–1.9)
DIFFERENTIAL METHOD: ABNORMAL
EOSINOPHIL # BLD AUTO: 0 K/UL (ref 0–0.5)
EOSINOPHIL NFR BLD: 0.2 % (ref 0–8)
ERYTHROCYTE [DISTWIDTH] IN BLOOD BY AUTOMATED COUNT: 13.5 % (ref 11.5–14.5)
HCT VFR BLD AUTO: 36.9 % (ref 37–48.5)
HGB BLD-MCNC: 11.5 G/DL (ref 12–16)
HIV 1+2 AB+HIV1 P24 AG SERPL QL IA: NEGATIVE
IMM GRANULOCYTES # BLD AUTO: 0.1 K/UL (ref 0–0.04)
IMM GRANULOCYTES NFR BLD AUTO: 1 % (ref 0–0.5)
LYMPHOCYTES # BLD AUTO: 2 K/UL (ref 1–4.8)
LYMPHOCYTES NFR BLD: 20.8 % (ref 18–48)
MCH RBC QN AUTO: 29.5 PG (ref 27–31)
MCHC RBC AUTO-ENTMCNC: 31.2 G/DL (ref 32–36)
MCV RBC AUTO: 95 FL (ref 82–98)
MONOCYTES # BLD AUTO: 0.5 K/UL (ref 0.3–1)
MONOCYTES NFR BLD: 5.2 % (ref 4–15)
NEUTROPHILS # BLD AUTO: 6.9 K/UL (ref 1.8–7.7)
NEUTROPHILS NFR BLD: 72.6 % (ref 38–73)
NRBC BLD-RTO: 0 /100 WBC
PLATELET # BLD AUTO: 208 K/UL (ref 150–350)
PMV BLD AUTO: 10.9 FL (ref 9.2–12.9)
RBC # BLD AUTO: 3.9 M/UL (ref 4–5.4)
RPR SER QL: NORMAL
WBC # BLD AUTO: 9.56 K/UL (ref 3.9–12.7)

## 2020-04-29 PROCEDURE — 0502F PR SUBSEQUENT PRENATAL CARE: ICD-10-PCS | Mod: CPTII,S$GLB,, | Performed by: ADVANCED PRACTICE MIDWIFE

## 2020-04-29 PROCEDURE — 36415 COLL VENOUS BLD VENIPUNCTURE: CPT

## 2020-04-29 PROCEDURE — 86592 SYPHILIS TEST NON-TREP QUAL: CPT

## 2020-04-29 PROCEDURE — 85025 COMPLETE CBC W/AUTO DIFF WBC: CPT

## 2020-04-29 PROCEDURE — 86703 HIV-1/HIV-2 1 RESULT ANTBDY: CPT

## 2020-04-29 PROCEDURE — 99999 PR PBB SHADOW E&M-EST. PATIENT-LVL III: CPT | Mod: PBBFAC,,, | Performed by: ADVANCED PRACTICE MIDWIFE

## 2020-04-29 PROCEDURE — 99999 PR PBB SHADOW E&M-EST. PATIENT-LVL III: ICD-10-PCS | Mod: PBBFAC,,, | Performed by: ADVANCED PRACTICE MIDWIFE

## 2020-04-29 PROCEDURE — 0502F SUBSEQUENT PRENATAL CARE: CPT | Mod: CPTII,S$GLB,, | Performed by: ADVANCED PRACTICE MIDWIFE

## 2020-04-29 PROCEDURE — 87081 CULTURE SCREEN ONLY: CPT

## 2020-04-29 NOTE — PROGRESS NOTES
24 y.o. female  at 36w1d  Reports + FM, denies VB, LOF or regular CTX  Doing well without concerns   GBS/ labs collected today   Discussed COVID precautions  Reviewed warning signs, normal FKCs, labor precautions and how/when to call.  RTC x 1 wks, call or present sooner prn.

## 2020-05-01 LAB — BACTERIA SPEC AEROBE CULT: NORMAL

## 2020-05-04 ENCOUNTER — OFFICE VISIT (OUTPATIENT)
Dept: OBSTETRICS AND GYNECOLOGY | Facility: CLINIC | Age: 25
End: 2020-05-04
Payer: COMMERCIAL

## 2020-05-04 VITALS — DIASTOLIC BLOOD PRESSURE: 63 MMHG | BODY MASS INDEX: 23.73 KG/M2 | SYSTOLIC BLOOD PRESSURE: 96 MMHG | WEIGHT: 147 LBS

## 2020-05-04 DIAGNOSIS — Z3A.35 35 WEEKS GESTATION OF PREGNANCY: ICD-10-CM

## 2020-05-04 PROCEDURE — 0502F PR SUBSEQUENT PRENATAL CARE: ICD-10-PCS | Mod: CPTII,,, | Performed by: ADVANCED PRACTICE MIDWIFE

## 2020-05-04 PROCEDURE — 0502F SUBSEQUENT PRENATAL CARE: CPT | Mod: CPTII,,, | Performed by: ADVANCED PRACTICE MIDWIFE

## 2020-05-04 NOTE — PROGRESS NOTES
The patient location is: Home in [place]  The chief complaint leading to consultation is: Prenatal visit  Visit type: audiovisual  Total time spent with patient: 15 min  Each patient to whom he or she provides medical services by telemedicine is:  (1) informed of the relationship between the physician and patient and the respective role of any other health care provider with respect to management of the patient; and (2) notified that he or she may decline to receive medical services by telemedicine and may withdraw from such care at any time.    24 y.o. female  at 36w6d, by Estimated Date of Delivery: 20    Doing well today.    ROS  OBSTETRICS:   Contractions: Nothing regular   Bleeding No   Loss of fluid No   Fetal mvmnt Yes  GASTRO:   Nausea No   Vomiting No      OB History    Para Term  AB Living   1             SAB TAB Ectopic Multiple Live Births                  # Outcome Date GA Lbr Frank/2nd Weight Sex Delivery Anes PTL Lv   1 Current                Dating reviewed  Allergies and problem list reviewed and updated  Medical and surgical history reviewed  Prenatal labs reviewed and updated    PHYSICAL EXAM  BP 96/63   Wt 66.7 kg (147 lb)   LMP 2019   BMI 23.73 kg/m²     GENERAL: No acute distress  HEENT: Normocephalic, atraumatic  NEURO: Alert and oriented x3  PSYCH: Normal mood and affect  PULMONARY: Non-labored respiration; no tachypnea      ASSESSMENT AND PLAN    ARIADNA MATUTE Problems (from 19 to present)     Problem Noted Resolved    Pregnancy 2019 by Livia Burden CNM No    Overview Addendum 2020 10:19 PM by Livia Burden CNM     Prepregnancy BMI: 20.5 (ABC max wt: 53 lb)  Pap: LSIL on 10/28/19  Dating - By LMP c/w 5w6d US done at [facility] (see record in Media section)  U/S - Normal anatomy  Aneuploidy screening - Thinking about doing quad screen - order is in, and patient will decide if she wants to get it done or not  Blood type:  A  POS  GDM screen - Passed 1 hr  Vaccines - [ ]Flu [ ]TDAP  GBS  [ ]Consents  Contraception - thinking IUD (got pregnant with KOREY--considering KYLEENA)  Harris Cruz  Circ - Girl/ n/a.         Birth Center Risk Assessment: 0- Meets birth center guidelines  12/17/2019 by Livia Burden CNM 4/20/2020 by Problem List Provider Inpatient Orders    Overview Addendum 1/17/2020  5:31 PM by Livia Burden CNM     Birth Center Risk Assessment: 0- Meets birth center guidelines     0- CNM management in ABC  1- CNM management on L&D  2- Consultation with OB to develop  plan of care  3- Collaborative CNM/OB management with delivery on L&D  4- Permanent referral of care to MD                 Delivery consents not signed yet, as this was a virtual visit. Patient agrees to review and sign at next visit.  Reviewed GBS neg  Reviewed repeat HIV/RPR both negative  Education regarding labor precautions.    Discussed recommendation for induction at 41wks. Patient declines and would prefer to await spontaneous labor. Discussed that in that case it is recommended to have biweekly prenatal testing during 41st week of pregnancy with IOL if non-reassuring, and IOL by 41w6d regardless. Patient agrees to this and will have these scheduled at her 40 wk visit.    Reviewed warning signs, normal FM, and how/when to call.      Discussed that the ABC is closed during COVID-19 outbreak, so all CNM patients will labor and give birth on L&D unit until further notice. Also discussed that all patients admitted to L&D will be tested for COVID-19 until further notice. Patient verbalized understanding.    Follow-up: 1 week, call or present sooner PRN

## 2020-05-05 ENCOUNTER — PATIENT MESSAGE (OUTPATIENT)
Dept: OBSTETRICS AND GYNECOLOGY | Facility: CLINIC | Age: 25
End: 2020-05-05

## 2020-05-13 ENCOUNTER — ROUTINE PRENATAL (OUTPATIENT)
Dept: OBSTETRICS AND GYNECOLOGY | Facility: CLINIC | Age: 25
End: 2020-05-13
Payer: COMMERCIAL

## 2020-05-13 VITALS — BODY MASS INDEX: 24.3 KG/M2 | SYSTOLIC BLOOD PRESSURE: 106 MMHG | WEIGHT: 150.56 LBS | DIASTOLIC BLOOD PRESSURE: 70 MMHG

## 2020-05-13 DIAGNOSIS — Z34.03 ENCOUNTER FOR SUPERVISION OF NORMAL FIRST PREGNANCY IN THIRD TRIMESTER: Primary | ICD-10-CM

## 2020-05-13 PROCEDURE — 0502F PR SUBSEQUENT PRENATAL CARE: ICD-10-PCS | Mod: CPTII,S$GLB,, | Performed by: ADVANCED PRACTICE MIDWIFE

## 2020-05-13 PROCEDURE — 99999 PR PBB SHADOW E&M-EST. PATIENT-LVL III: ICD-10-PCS | Mod: PBBFAC,,, | Performed by: ADVANCED PRACTICE MIDWIFE

## 2020-05-13 PROCEDURE — 0502F SUBSEQUENT PRENATAL CARE: CPT | Mod: CPTII,S$GLB,, | Performed by: ADVANCED PRACTICE MIDWIFE

## 2020-05-13 PROCEDURE — 99999 PR PBB SHADOW E&M-EST. PATIENT-LVL III: CPT | Mod: PBBFAC,,, | Performed by: ADVANCED PRACTICE MIDWIFE

## 2020-05-13 NOTE — PROGRESS NOTES
24 y.o. female  at 38w1d   Reports + FM, denies VB, LOF or regular CTX  Doing well without concerns   TW lbs   Reviewed warning signs, normal FKCs, labor precautions and how/when to call.  RTC x 1 wks, call or present sooner prn.

## 2020-05-20 ENCOUNTER — ROUTINE PRENATAL (OUTPATIENT)
Dept: OBSTETRICS AND GYNECOLOGY | Facility: CLINIC | Age: 25
End: 2020-05-20
Payer: COMMERCIAL

## 2020-05-20 VITALS
DIASTOLIC BLOOD PRESSURE: 70 MMHG | BODY MASS INDEX: 24.68 KG/M2 | WEIGHT: 152.88 LBS | SYSTOLIC BLOOD PRESSURE: 118 MMHG

## 2020-05-20 DIAGNOSIS — Z34.93 PRENATAL CARE IN THIRD TRIMESTER: ICD-10-CM

## 2020-05-20 DIAGNOSIS — Z3A.39 39 WEEKS GESTATION OF PREGNANCY: Primary | ICD-10-CM

## 2020-05-20 PROCEDURE — 0502F PR SUBSEQUENT PRENATAL CARE: ICD-10-PCS | Mod: CPTII,S$GLB,, | Performed by: NURSE PRACTITIONER

## 2020-05-20 PROCEDURE — 0502F SUBSEQUENT PRENATAL CARE: CPT | Mod: CPTII,S$GLB,, | Performed by: NURSE PRACTITIONER

## 2020-05-20 PROCEDURE — 99999 PR PBB SHADOW E&M-EST. PATIENT-LVL III: CPT | Mod: PBBFAC,,, | Performed by: NURSE PRACTITIONER

## 2020-05-20 PROCEDURE — 99999 PR PBB SHADOW E&M-EST. PATIENT-LVL III: ICD-10-PCS | Mod: PBBFAC,,, | Performed by: NURSE PRACTITIONER

## 2020-05-20 NOTE — PROGRESS NOTES
24 y.o. female  at 39w1d   Reports + FM, denies VB, LOF or regular CTX  Doing well without concerns. Reviewed policy changes 2/2 COVID, such as visitor policy, COVID screening upon admission. Informed pt that [facility] is now open for births. Pt excited.   TW lbs   Delivery consents already signed  Reviewed GBS (-)  Reviewed repeat HIV/RPR (-)/(non-reactive)  Reviewed warning signs, normal FKCs, labor precautions and how/when to call.  RTC x 1 wks, call or present sooner prn.     Birth Center Risk Assessment: 0- Meets birth center guidelines    0- CNM management in ABC  1- CNM management on L&D  2- Consultation with OB to develop  plan of care  3- Collaborative CNM/OB management with delivery on L&D  4- Permanent referral of care to MD

## 2020-05-27 ENCOUNTER — ROUTINE PRENATAL (OUTPATIENT)
Dept: OBSTETRICS AND GYNECOLOGY | Facility: CLINIC | Age: 25
End: 2020-05-27
Payer: COMMERCIAL

## 2020-05-27 VITALS
SYSTOLIC BLOOD PRESSURE: 114 MMHG | DIASTOLIC BLOOD PRESSURE: 66 MMHG | BODY MASS INDEX: 24.91 KG/M2 | WEIGHT: 154.31 LBS

## 2020-05-27 DIAGNOSIS — Z3A.41 41 WEEKS GESTATION OF PREGNANCY: Primary | ICD-10-CM

## 2020-05-27 DIAGNOSIS — O48.0 41 WEEKS GESTATION OF PREGNANCY: Primary | ICD-10-CM

## 2020-05-27 PROCEDURE — 99999 PR PBB SHADOW E&M-EST. PATIENT-LVL III: ICD-10-PCS | Mod: PBBFAC,,, | Performed by: ADVANCED PRACTICE MIDWIFE

## 2020-05-27 PROCEDURE — 0502F PR SUBSEQUENT PRENATAL CARE: ICD-10-PCS | Mod: CPTII,S$GLB,, | Performed by: ADVANCED PRACTICE MIDWIFE

## 2020-05-27 PROCEDURE — 0502F SUBSEQUENT PRENATAL CARE: CPT | Mod: CPTII,S$GLB,, | Performed by: ADVANCED PRACTICE MIDWIFE

## 2020-05-27 PROCEDURE — 99999 PR PBB SHADOW E&M-EST. PATIENT-LVL III: CPT | Mod: PBBFAC,,, | Performed by: ADVANCED PRACTICE MIDWIFE

## 2020-05-27 NOTE — PROGRESS NOTES
25 y.o. female  at 40w1d   Reports + FM, denies VB, LOF or regular CTX  Doing well without concerns   TW lbs   Discussed postdates management and IOL - she prefers to await spontaneous labor if possible   Discussed postdates prenatal testing and that IOL would be recommended immediately if prenatal testing is not reassuring; she states understanding and agrees to this, NST/RYLEE scheduled for day of office visit  Epic staff message sent to CNMs and Shannan Reza RN re: scheduling of IOL  Reviewed warning signs, normal FKCs, labor precautions and how/when to call.  RTC x 1 wks, call or present sooner prn.

## 2020-06-03 ENCOUNTER — HOSPITAL ENCOUNTER (INPATIENT)
Facility: OTHER | Age: 25
LOS: 1 days | Discharge: HOME | End: 2020-06-04
Attending: OBSTETRICS & GYNECOLOGY | Admitting: OBSTETRICS & GYNECOLOGY
Payer: COMMERCIAL

## 2020-06-03 ENCOUNTER — ANESTHESIA (OUTPATIENT)
Dept: OBSTETRICS AND GYNECOLOGY | Facility: OTHER | Age: 25
End: 2020-06-03

## 2020-06-03 ENCOUNTER — ANESTHESIA EVENT (OUTPATIENT)
Dept: OBSTETRICS AND GYNECOLOGY | Facility: OTHER | Age: 25
End: 2020-06-03

## 2020-06-03 DIAGNOSIS — Z3A.41 POST TERM PREGNANCY, 41 WEEKS: ICD-10-CM

## 2020-06-03 DIAGNOSIS — O48.0 POST TERM PREGNANCY, 41 WEEKS: ICD-10-CM

## 2020-06-03 DIAGNOSIS — Z37.9 NORMAL LABOR: ICD-10-CM

## 2020-06-03 LAB
ABO + RH BLD: NORMAL
BASOPHILS # BLD AUTO: 0.02 K/UL (ref 0–0.2)
BASOPHILS NFR BLD: 0.1 % (ref 0–1.9)
BLD GP AB SCN CELLS X3 SERPL QL: NORMAL
DIFFERENTIAL METHOD: ABNORMAL
EOSINOPHIL # BLD AUTO: 0.1 K/UL (ref 0–0.5)
EOSINOPHIL NFR BLD: 0.6 % (ref 0–8)
ERYTHROCYTE [DISTWIDTH] IN BLOOD BY AUTOMATED COUNT: 13.8 % (ref 11.5–14.5)
HCT VFR BLD AUTO: 35 % (ref 37–48.5)
HGB BLD-MCNC: 11.4 G/DL (ref 12–16)
IMM GRANULOCYTES # BLD AUTO: 0.08 K/UL (ref 0–0.04)
IMM GRANULOCYTES NFR BLD AUTO: 0.5 % (ref 0–0.5)
LYMPHOCYTES # BLD AUTO: 1.2 K/UL (ref 1–4.8)
LYMPHOCYTES NFR BLD: 8.1 % (ref 18–48)
MCH RBC QN AUTO: 29.7 PG (ref 27–31)
MCHC RBC AUTO-ENTMCNC: 32.6 G/DL (ref 32–36)
MCV RBC AUTO: 91 FL (ref 82–98)
MONOCYTES # BLD AUTO: 0.6 K/UL (ref 0.3–1)
MONOCYTES NFR BLD: 4 % (ref 4–15)
NEUTROPHILS # BLD AUTO: 13 K/UL (ref 1.8–7.7)
NEUTROPHILS NFR BLD: 86.7 % (ref 38–73)
NRBC BLD-RTO: 0 /100 WBC
PLATELET # BLD AUTO: 176 K/UL (ref 150–350)
PMV BLD AUTO: 12.1 FL (ref 9.2–12.9)
RBC # BLD AUTO: 3.84 M/UL (ref 4–5.4)
SARS-COV-2 RDRP RESP QL NAA+PROBE: NEGATIVE
WBC # BLD AUTO: 15.03 K/UL (ref 3.9–12.7)

## 2020-06-03 PROCEDURE — 63600175 PHARM REV CODE 636 W HCPCS: Performed by: ADVANCED PRACTICE MIDWIFE

## 2020-06-03 PROCEDURE — 11000001 HC ACUTE MED/SURG PRIVATE ROOM

## 2020-06-03 PROCEDURE — 85025 COMPLETE CBC W/AUTO DIFF WBC: CPT

## 2020-06-03 PROCEDURE — 59400 OBSTETRICAL CARE: CPT | Mod: GB,,, | Performed by: ADVANCED PRACTICE MIDWIFE

## 2020-06-03 PROCEDURE — 25000003 PHARM REV CODE 250: Performed by: ADVANCED PRACTICE MIDWIFE

## 2020-06-03 PROCEDURE — 99285 EMERGENCY DEPT VISIT HI MDM: CPT | Mod: 25

## 2020-06-03 PROCEDURE — 0502F PR SUBSEQUENT PRENATAL CARE: ICD-10-PCS | Mod: ,,, | Performed by: ADVANCED PRACTICE MIDWIFE

## 2020-06-03 PROCEDURE — 72200005 HC VAGINAL DELIVERY LEVEL II: Performed by: ADVANCED PRACTICE MIDWIFE

## 2020-06-03 PROCEDURE — 72100002 HC LABOR CARE, 1ST 8 HOURS

## 2020-06-03 PROCEDURE — 99283 PR EMERGENCY DEPT VISIT,LEVEL III: ICD-10-PCS | Mod: ,,, | Performed by: OBSTETRICS & GYNECOLOGY

## 2020-06-03 PROCEDURE — 59400 PR FULL ROUT OBSTE CARE,VAGINAL DELIV: ICD-10-PCS | Mod: GB,,, | Performed by: ADVANCED PRACTICE MIDWIFE

## 2020-06-03 PROCEDURE — U0002 COVID-19 LAB TEST NON-CDC: HCPCS

## 2020-06-03 PROCEDURE — 0502F SUBSEQUENT PRENATAL CARE: CPT | Mod: ,,, | Performed by: ADVANCED PRACTICE MIDWIFE

## 2020-06-03 PROCEDURE — 99283 EMERGENCY DEPT VISIT LOW MDM: CPT | Mod: ,,, | Performed by: OBSTETRICS & GYNECOLOGY

## 2020-06-03 PROCEDURE — 86901 BLOOD TYPING SEROLOGIC RH(D): CPT

## 2020-06-03 RX ORDER — OXYTOCIN/RINGER'S LACTATE 30/500 ML
95 PLASTIC BAG, INJECTION (ML) INTRAVENOUS ONCE
Status: COMPLETED | OUTPATIENT
Start: 2020-06-03 | End: 2020-06-03

## 2020-06-03 RX ORDER — OXYTOCIN 10 [USP'U]/ML
10 INJECTION, SOLUTION INTRAMUSCULAR; INTRAVENOUS ONCE
Status: DISCONTINUED | OUTPATIENT
Start: 2020-06-03 | End: 2020-06-04 | Stop reason: HOSPADM

## 2020-06-03 RX ORDER — DIPHENHYDRAMINE HYDROCHLORIDE 50 MG/ML
25 INJECTION INTRAMUSCULAR; INTRAVENOUS EVERY 4 HOURS PRN
Status: DISCONTINUED | OUTPATIENT
Start: 2020-06-03 | End: 2020-06-04 | Stop reason: HOSPADM

## 2020-06-03 RX ORDER — OXYTOCIN/RINGER'S LACTATE 30/500 ML
95 PLASTIC BAG, INJECTION (ML) INTRAVENOUS ONCE
Status: CANCELLED | OUTPATIENT
Start: 2020-06-03 | End: 2020-06-03

## 2020-06-03 RX ORDER — MISOPROSTOL 200 UG/1
TABLET ORAL
Status: DISCONTINUED
Start: 2020-06-03 | End: 2020-06-03 | Stop reason: WASHOUT

## 2020-06-03 RX ORDER — CARBOPROST TROMETHAMINE 250 UG/ML
250 INJECTION, SOLUTION INTRAMUSCULAR
Status: DISCONTINUED | OUTPATIENT
Start: 2020-06-03 | End: 2020-06-04 | Stop reason: HOSPADM

## 2020-06-03 RX ORDER — HYDROCORTISONE 25 MG/G
CREAM TOPICAL 3 TIMES DAILY PRN
Status: DISCONTINUED | OUTPATIENT
Start: 2020-06-03 | End: 2020-06-04 | Stop reason: HOSPADM

## 2020-06-03 RX ORDER — IBUPROFEN 600 MG/1
600 TABLET ORAL EVERY 6 HOURS
Status: DISCONTINUED | OUTPATIENT
Start: 2020-06-03 | End: 2020-06-04 | Stop reason: HOSPADM

## 2020-06-03 RX ORDER — ACETAMINOPHEN 325 MG/1
650 TABLET ORAL EVERY 6 HOURS PRN
Status: DISCONTINUED | OUTPATIENT
Start: 2020-06-03 | End: 2020-06-04 | Stop reason: HOSPADM

## 2020-06-03 RX ORDER — METHYLERGONOVINE MALEATE 0.2 MG/ML
200 INJECTION INTRAVENOUS
Status: DISCONTINUED | OUTPATIENT
Start: 2020-06-03 | End: 2020-06-04 | Stop reason: HOSPADM

## 2020-06-03 RX ORDER — ONDANSETRON 8 MG/1
8 TABLET, ORALLY DISINTEGRATING ORAL EVERY 8 HOURS PRN
Status: DISCONTINUED | OUTPATIENT
Start: 2020-06-03 | End: 2020-06-04 | Stop reason: HOSPADM

## 2020-06-03 RX ORDER — MISOPROSTOL 200 UG/1
800 TABLET ORAL
Status: DISCONTINUED | OUTPATIENT
Start: 2020-06-03 | End: 2020-06-04 | Stop reason: HOSPADM

## 2020-06-03 RX ORDER — SIMETHICONE 80 MG
1 TABLET,CHEWABLE ORAL EVERY 6 HOURS PRN
Status: DISCONTINUED | OUTPATIENT
Start: 2020-06-03 | End: 2020-06-04 | Stop reason: HOSPADM

## 2020-06-03 RX ORDER — ONDANSETRON 8 MG/1
8 TABLET, ORALLY DISINTEGRATING ORAL EVERY 8 HOURS PRN
Status: CANCELLED | OUTPATIENT
Start: 2020-06-03

## 2020-06-03 RX ORDER — CALCIUM CARBONATE 200(500)MG
500 TABLET,CHEWABLE ORAL 3 TIMES DAILY PRN
Status: DISCONTINUED | OUTPATIENT
Start: 2020-06-03 | End: 2020-06-04 | Stop reason: HOSPADM

## 2020-06-03 RX ORDER — DIPHENHYDRAMINE HCL 25 MG
25 CAPSULE ORAL EVERY 4 HOURS PRN
Status: DISCONTINUED | OUTPATIENT
Start: 2020-06-03 | End: 2020-06-04 | Stop reason: HOSPADM

## 2020-06-03 RX ORDER — PRENATAL WITH FERROUS FUM AND FOLIC ACID 3080; 920; 120; 400; 22; 1.84; 3; 20; 10; 1; 12; 200; 27; 25; 2 [IU]/1; [IU]/1; MG/1; [IU]/1; MG/1; MG/1; MG/1; MG/1; MG/1; MG/1; UG/1; MG/1; MG/1; MG/1; MG/1
1 TABLET ORAL DAILY
Status: DISCONTINUED | OUTPATIENT
Start: 2020-06-04 | End: 2020-06-04 | Stop reason: HOSPADM

## 2020-06-03 RX ORDER — SIMETHICONE 80 MG
1 TABLET,CHEWABLE ORAL 4 TIMES DAILY PRN
Status: DISCONTINUED | OUTPATIENT
Start: 2020-06-03 | End: 2020-06-04 | Stop reason: HOSPADM

## 2020-06-03 RX ORDER — LIDOCAINE HYDROCHLORIDE 20 MG/ML
200 INJECTION, SOLUTION INFILTRATION; PERINEURAL ONCE
Status: COMPLETED | OUTPATIENT
Start: 2020-06-03 | End: 2020-06-03

## 2020-06-03 RX ORDER — LIDOCAINE HYDROCHLORIDE 10 MG/ML
10 INJECTION INFILTRATION; PERINEURAL ONCE
Status: DISCONTINUED | OUTPATIENT
Start: 2020-06-03 | End: 2020-06-04 | Stop reason: HOSPADM

## 2020-06-03 RX ORDER — DOCUSATE SODIUM 100 MG/1
200 CAPSULE, LIQUID FILLED ORAL 2 TIMES DAILY PRN
Status: DISCONTINUED | OUTPATIENT
Start: 2020-06-03 | End: 2020-06-04 | Stop reason: HOSPADM

## 2020-06-03 RX ADMIN — LIDOCAINE HYDROCHLORIDE 200 MG: 20 INJECTION, SOLUTION INFILTRATION; PERINEURAL at 02:06

## 2020-06-03 RX ADMIN — METHYLERGONOVINE MALEATE 200 MCG: 0.2 INJECTION, SOLUTION INTRAMUSCULAR; INTRAVENOUS at 03:06

## 2020-06-03 RX ADMIN — Medication 2 MILLI-UNITS/MIN: at 01:06

## 2020-06-03 NOTE — PROGRESS NOTES
[facility]  ABC Transfer Note      6/3/2020    Transfer From: ABC to LDR    Transfer Indication: Non reassuring FHTs    Transfer via stretcher    Transfer with no additional medical equipment    Time decision was made to transfer: 1150    Time of consults or notification of receiving provider/ unit: 1150    Time we left ABC: 1152    Time of arrival to transfer location: 1156    Transported by: REMI Rose & DARRON Wright    Medicines sent: WILLIE    Medications administered in ABC: NA    Interventions performed in ABC: Pt on left lat    Maternal- Infant Interaction if  Transport: NA    Accompanied by: Manuel, her partner    Upon arrival to floor: cardiac monitor applied, patient oriented to room, call bell in reach and bed in lowest position    Report given to: WILLIE    Outcome of transfer: Continuous EFM    Type of Birth spontaneous vaginal delivery    Indication of  if applicable: NA    Condition of Mother:     Condition of Infant:    APGARS:   Apgars    Living status:    Apgars:   1 min.:   5 min.:   10 min.:   15 min.:   20 min.:     Skin color:          Heart rate:          Reflex irritability:          Muscle tone:          Respiratory effort:          Total:   9 9             Any maternal or  complications including NICU/ ICU admission: No

## 2020-06-03 NOTE — ED PROVIDER NOTES
Encounter Date: 6/3/2020       History     Chief Complaint   Patient presents with    Contractions    Rupture of Membranes     24 y/o  @ 41w0d presents to OB ED complaining of contractions that began 20 @ 2300 and have increased in frequency and intensity since that time and SROM @ 0430 for clear fluid. She denies vaginal bleeding and reports + FM.         Review of patient's allergies indicates:   Allergen Reactions    Pistachio nut Hives and Swelling    Apple Hives, Itching, Palpitations and Swelling    Nuts [tree nut]      Past Medical History:   Diagnosis Date    LGSIL on Pap smear of cervix      No past surgical history on file.  No family history on file.  Social History     Tobacco Use    Smoking status: Never Smoker    Smokeless tobacco: Never Used   Substance Use Topics    Alcohol use: Not Currently    Drug use: Never     Review of Systems   Constitutional: Negative for fever.   Eyes: Negative for visual disturbance.   Respiratory: Negative for cough, chest tightness and shortness of breath.    Cardiovascular: Negative for chest pain and leg swelling.   Gastrointestinal: Positive for abdominal pain (contractions ). Negative for constipation, diarrhea, nausea and vomiting.   Genitourinary: Positive for vaginal discharge (clear amniotic fluid - copious ). Negative for genital sores.   Neurological: Negative for dizziness, light-headedness and headaches.       Physical Exam     Initial Vitals [20 0515]   BP Pulse Resp Temp SpO2   129/77 77 18 -- --      MAP       --         Physical Exam    Nursing note and vitals reviewed.  Constitutional: She appears well-developed and well-nourished. She is not diaphoretic. No distress.   HENT:   Head: Normocephalic.   Neck: Normal range of motion.   Cardiovascular: Normal rate and regular rhythm.   Pulmonary/Chest: Breath sounds normal. No respiratory distress.   Abdominal: Soft.   Genitourinary: Vaginal discharge (clear amniotic fluid - copious  amount leaking on speculum exam) found.   Genitourinary Comments: Vertex by sono   5/90/-2  EFW 7 lbs    Musculoskeletal: Normal range of motion.   Neurological: She is alert and oriented to person, place, and time.   Skin: Skin is warm and dry.   Psychiatric: She has a normal mood and affect. Her behavior is normal. Judgment and thought content normal.     FHT: 135  Contractions: q 2-4 minutes apart, moderate     ED Course   Procedures  Labs Reviewed - No data to display       Imaging Results    None                                          Clinical Impression:   Normal labor with SROM - Admit to ABC for expectant management.                            Cinthya Glass CNM  06/03/20 0534

## 2020-06-03 NOTE — PROGRESS NOTES
"LABOR NOTE    S:  Pt resting in bed, excellent pushing effort with UCs. Manuel at bedside and supportive.     O: /67   Pulse 86   Temp 97.5 °F (36.4 °C) (Oral)   Resp 16   Ht 5' 6" (1.676 m)   Wt 68 kg (150 lb)   LMP 2019   SpO2 98%   Breastfeeding? Unknown   BMI 24.21 kg/m²     GENERAL: Calm and appropriate affect  NEURO: Alert, oriented, normal speech  ABDOMEN: Nontender, Fundus palpates soft between UC's.  FHT: Baseline 140, minimal BTBV, no accels, variable decels. Cat 2.  CTX: q 2-4 minutes  SVE: 10/100/0      ASSESSMENT:   25 y.o.  IUP at 41w1d, Cat 2  Protracted active phase    Patient Active Problem List   Diagnosis    Pregnancy    Birth Center Risk Assessment: 0- Meets birth center guidelines     LGSIL on Pap smear of cervix --- PP PAP    Normal labor         PLAN:  Pt declines pitocin augmentation  Anticipate   Continue close Maternal/Fetal monitoring      Peggy Smith CNM    "

## 2020-06-03 NOTE — PROGRESS NOTES
"LABOR NOTE    S:  Pt began feeling intense pressure in her butt and urge to push approx 40min ago. Pushing intermittently with UCs, but now UCs less strong and spacing somewhat. Urge to push decreased. Still in tub with Manuel at her side.     O: BP (!) 119/56 (BP Location: Left arm, Patient Position: Lying)   Pulse 82   Temp 99.1 °F (37.3 °C) (Oral)   Resp 16   Ht 5' 6" (1.676 m)   Wt 68 kg (150 lb)   LMP 2019   SpO2 96%   Breastfeeding? No   BMI 24.21 kg/m²     GENERAL: Calm and appropriate affect  NEURO: Alert, oriented, normal speech  ABDOMEN: Nontender, Fundus palpates soft between UC's.  FHT: Baseline 145, moderate BTBV, no decels. Reassuring.  CTX: q 2-3 minutes  SVE: Ant lip/100/0 (VE in tub)      ASSESSMENT:   25 y.o.  IUP at 41w1d,     Patient Active Problem List   Diagnosis    Pregnancy    Birth Center Risk Assessment: 0- Meets birth center guidelines     LGSIL on Pap smear of cervix --- PP PAP    Normal labor         PLAN:  Ant lip of cervix still palpable with VE during pushing effort (at pt's request). Bulging membranes felt - attempted AROM bluntly/digitally during exam - possible clear fluid (pt in tub).  Pt feeling tired with decreased urge to push  Out of tub and to toilet to attempt to void. Will continue labor support out of the tub and coaching to not push with UCs at this time until completely dilated. Pt coping well  Hydration with coconut water and water (juice or snack offered, pt declines).  Continue close Maternal/Fetal monitoring  Recheck 1 hours or PRN      Peggy Smith CNM    "

## 2020-06-03 NOTE — PROGRESS NOTES
"LABOR NOTE    S:  Pt very calm and with excellent pushing effort, but reports exhaustion.  Manuel at bedside and supportive.     O: /67   Pulse 86   Temp 97.5 °F (36.4 °C) (Oral)   Resp 16   Ht 5' 6" (1.676 m)   Wt 68 kg (150 lb)   LMP 2019   SpO2 98%   Breastfeeding? Unknown   BMI 24.21 kg/m²     GENERAL: Calm and appropriate affect  NEURO: Alert, oriented, normal speech  ABDOMEN: Nontender, Fundus palpates soft between UC's.  FHT: Baseline 140, moderate BTBV, positive accels, variable decels. Cat 2.  CTX: q 2-4 minutes  SVE: 10/100/+2      ASSESSMENT:   25 y.o.  IUP at 41w1d, Cat 2    Patient Active Problem List   Diagnosis    Pregnancy    Birth Center Risk Assessment: 0- Meets birth center guidelines     LGSIL on Pap smear of cervix --- PP PAP    Normal labor         PLAN:  Pt has been pushing strongly for 2hrs now with slow fetal descent and minimal progress.  Dr. Snow consulted and to room to assess. Baby originally in ROT, now direct OA,  Continue pushing efforts  Pt now agreeable to Pitocin Augmentation per protocol - currently infusing at 2mu      Peggy Smith CNM    "

## 2020-06-03 NOTE — H&P
[facility] OB ED  Obstetrics  History & Physical    Patient Name: Chris Alexander  MRN: [id number]  Admission Date: 6/3/2020  Primary Care Provider: Primary Doctor No    Subjective:     Principal Problem:Normal labor    History of Present Illness:   Chris Alexander is a 25 y.o. female  at 41w1d with Estimated Date of Delivery: 20[facility] accompanied by boyfriend, Manuel. Expecting a girl!    Reports regular uterine contractions since 20 @ 2300. They are now 3 minutes apart and increasing in intensity and duration.  moderate contractions, active fetal movement, No vaginal bleeding , Yes loss of fluid.     Last ate granola at 0430, last drank water at currently.     This pregnancy has been complicated by the following:  Patient Active Problem List   Diagnosis    Pregnancy    Birth Center Risk Assessment: 0- Meets birth center guidelines     LGSIL on Pap smear of cervix --- PP PAP    Normal labor        Presentation: Vertex   Estimated Fetal Weight: 7 lbs    Birth Center Risk Assessment: 0- Meets birth center guidelines    0- CNM management in ABC  1- CNM management on L&D  2- Consultation with OB to develop  plan of care  3- Collaborative CNM/OB management with delivery on L&D   4- Permanent referral of care to MD          Obstetric HPI:  Patient reports Date/time of onset: 20 @ 2300, Frequency: Every 3 minutes, Duration: 60 seconds and Intensity: moderate contractions, active fetal movement, No vaginal bleeding , Yes loss of fluid     This pregnancy has been uncomplicated.    OB History    Para Term  AB Living   1 0 0 0 0 0   SAB TAB Ectopic Multiple Live Births   0 0 0 0 0      # Outcome Date GA Lbr Frank/2nd Weight Sex Delivery Anes PTL Lv   1 Current              Past Medical History:   Diagnosis Date    LGSIL on Pap smear of cervix 2019     History reviewed. No pertinent surgical history.      (Not in a hospital admission)    Review of patient's allergies  indicates:   Allergen Reactions    Pistachio nut Hives and Swelling    Apple Hives, Itching, Palpitations and Swelling    Nuts [tree nut]         Family History     None        Tobacco Use    Smoking status: Never Smoker    Smokeless tobacco: Never Used   Substance and Sexual Activity    Alcohol use: Not Currently    Drug use: Never    Sexual activity: Yes     Partners: Male     Review of Systems   Constitutional: Negative for fever.   Eyes: Negative for visual disturbance.   Respiratory: Negative for cough and shortness of breath.    Cardiovascular: Negative for chest pain and leg swelling.   Gastrointestinal: Positive for abdominal pain (contractions ) and diarrhea (pt reports loose stool overnight). Negative for constipation, nausea and vomiting.   Genitourinary: Positive for vaginal discharge (clear amniotic fluid). Negative for genital sores and vaginal bleeding.   Neurological: Negative for headaches.      Objective:     Vital Signs (Most Recent):  Pulse: 77 (06/03/20 0515)  Resp: 18 (06/03/20 0515)  BP: 129/77 (06/03/20 0515) Vital Signs (24h Range):  Pulse:  [77] 77  Resp:  [18] 18  BP: (129)/(77) 129/77        There is no height or weight on file to calculate BMI.    FHT: 135 doppler - no decels   CTX: Q 3-5 minutes    Physical Exam:   Constitutional: She is oriented to person, place, and time. She appears well-developed and well-nourished. No distress.    HENT:   Head: Normocephalic.    Eyes: Pupils are equal, round, and reactive to light.    Neck: Normal range of motion.    Cardiovascular: Normal rate and regular rhythm.     Pulmonary/Chest: Effort normal. No respiratory distress.        Abdominal: Soft.     Genitourinary: Vaginal discharge (copious clear amniotic fluid) found.   Genitourinary Comments: Vertex by sono              Musculoskeletal: Normal range of motion and moves all extremeties.       Neurological: She is alert and oriented to person, place, and time.    Skin: Skin is warm and  dry. She is not diaphoretic.    Psychiatric: She has a normal mood and affect. Her behavior is normal. Judgment and thought content normal.       Cervix:  Dilation:  5  Effacement:  90  Station: -2  Presentation: Vertex     Significant Labs:  Lab Results   Component Value Date    GROUPTRH A POS 2020    HEPBSAG Negative 10/17/2019    STREPBCULT No Group B Streptococcus isolated 2020       I have personallly reviewed all pertinent lab results from the last 24 hours.    Assessment/Plan:     25 y.o. female  at 41w1d for:    * Normal labor  Admit to ABC for expectant managment      Cinthya Glass CNM  Obstetrics  [facility] OB ED

## 2020-06-03 NOTE — L&D DELIVERY NOTE
[facility]  Vaginal Delivery       SUMMARY     Spontaneous vaginal delivery of viable female infant, OA, at 1413. Apgars 9/9, wt: 8lb 3.9oz., over an intact perineum.   Pt reached down and pulled her vigorous infant up to her chest and waiting nursery staff. Delayed cord clamping  Placenta delivered spontaneously and intact via Bird, 3 vessel cord, at 1427, several large clots delivered with placenta. Fundus firm, uterus intact.  Bilateral 1st degree periurethral/labial lacs repaired with 3-0 Vicryl on SH (using local anesthesia)  1st degree vaginal laceration repaired with 3-0 vicryl under local anesthesia.   Continued intermittent light trickle of bleeding during repair and following - fundus boggy intermittently and then firm with massage, several small clots evacuated from cervix/lower uterine segment. Methergine given IM - fundus firm following with hemostasis noted.  EBL 650mL.    Pitocin 20U in LR 500mL infusing.   Mom and baby stable with baby skin to skin and family bonding well  Patient tolerated delivery well. Sponge needle and lap counted correctly x2.    Indications: Normal labor  Pregnancy complicated by:   Patient Active Problem List   Diagnosis    Pregnancy    Birth Center Risk Assessment: 0- Meets birth center guidelines     LGSIL on Pap smear of cervix --- PP PAP    Normal labor     Admitting GA: 41w1d    Delivery Information for Girl Chris Alexander    Birth information:  YOB: 2022   Time of birth: 2:13 PM   Sex: female   Head Delivery Date/Time: 6/3/2020  2:12 PM   Delivery type: Vaginal, Spontaneous   Gestational Age: 41w1d    Delivery Providers    Delivering clinician:  Peggy Smith CNM   Provider Role    Amalia Gasca RN Registered Nurse    Bibi Blanco RN Registered Nurse            Measurements    Weight:  3742 g  Length:  54.6 cm  Head circumference:  35.6 cm  Chest circumference:  35.6 cm         Apgars    Living status:  Living  Apgars:   1  min.:   5 min.:   10 min.:   15 min.:   20 min.:     Skin color:   1  1       Heart rate:   2  2       Reflex irritability:   2  2       Muscle tone:   2  2       Respiratory effort:   2  2       Total:   9  9       Apgars assigned by:  GLORIA THURSTON RN         Operative Delivery    Forceps attempted?:  No  Vacuum extractor attempted?:  No         Shoulder Dystocia    Shoulder dystocia present?:  No           Presentation    Presentation:  Vertex  Position:  Occiput           Interventions/Resuscitation    Method:  Bulb Suctioning, Tactile Stimulation       Cord    Vessels:  3 vessels  Complications:  None  Delayed Cord Clamping?:  Yes  Cord Clamped Date/Time:  6/3/2020  2:17 PM  Cord Blood Disposition:  Sent with Baby  Gases Sent?:  No  Stem Cell Collection (by MD):  No       Placenta    Placenta delivery date/time:  6/3/2020 142  Placenta removal:  Spontaneous  Placenta appearance:  Intact  Placenta disposition:  discarded           Labor Events:       labor: No     Labor Onset Date/Time:         Dilation Complete Date/Time:         Start Pushing Date/Time:         Start Pushing Date/Time:       Rupture Date/Time: 20  043         Rupture type: spontaneous rupture of membranes         Fluid Amount: Moderate      Fluid Color: Clear      Fluid Odor:        Membrane Status: SRM (Spontaneous Rupture)               steroids: None     Antibiotics given for GBS: No     Induction: none     Indications for induction:        Augmentation: oxytocin     Indications for augmentation: Ineffective Contraction Pattern     Labor complications: None     Additional complications:          Cervical ripening:                     Delivery:      Episiotomy: None     Indication for Episiotomy:       Perineal Lacerations: 1st Repaired:  Yes   Periurethral Laceration: bilateral Repaired: Yes   Labial Laceration: bilateral Repaired: Yes   Sulcus Laceration:   Repaired:     Vaginal Laceration:   Repaired:      Cervical Laceration:   Repaired:     Repair suture:       Repair # of packets: 3     Last Value - EBL - Nursing (mL): 650     Sum - EBL - Nursing (mL): 650     Last Value - EBL - Anesthesia (mL):      Calculated QBL (mL):        Vaginal Sweep Performed: Yes     Surgicount Correct: Yes       Other providers:       Anesthesia    Method:  None          Details (if applicable):  Trial of Labor      Categorization:      Priority:     Indications for :     Incision Type:       Additional  information:  Forceps:    Vacuum:    Breech:    Observed anomalies    Other (Comments):

## 2020-06-03 NOTE — SUBJECTIVE & OBJECTIVE
Obstetric HPI:  Patient reports Date/time of onset: 20 @ 2300, Frequency: Every 3 minutes, Duration: 60 seconds and Intensity: moderate contractions, active fetal movement, No vaginal bleeding , Yes loss of fluid     This pregnancy has been uncomplicated.    OB History    Para Term  AB Living   1 0 0 0 0 0   SAB TAB Ectopic Multiple Live Births   0 0 0 0 0      # Outcome Date GA Lbr Frank/2nd Weight Sex Delivery Anes PTL Lv   1 Current              Past Medical History:   Diagnosis Date    LGSIL on Pap smear of cervix 2019     History reviewed. No pertinent surgical history.      (Not in a hospital admission)    Review of patient's allergies indicates:   Allergen Reactions    Pistachio nut Hives and Swelling    Apple Hives, Itching, Palpitations and Swelling    Nuts [tree nut]         Family History     None        Tobacco Use    Smoking status: Never Smoker    Smokeless tobacco: Never Used   Substance and Sexual Activity    Alcohol use: Not Currently    Drug use: Never    Sexual activity: Yes     Partners: Male     Review of Systems   Constitutional: Negative for fever.   Eyes: Negative for visual disturbance.   Respiratory: Negative for cough and shortness of breath.    Cardiovascular: Negative for chest pain and leg swelling.   Gastrointestinal: Positive for abdominal pain (contractions ) and diarrhea (pt reports loose stool overnight). Negative for constipation, nausea and vomiting.   Genitourinary: Positive for vaginal discharge (clear amniotic fluid). Negative for genital sores and vaginal bleeding.   Neurological: Negative for headaches.      Objective:     Vital Signs (Most Recent):  Pulse: 77 (20 0515)  Resp: 18 (20 0515)  BP: 129/77 (20 0515) Vital Signs (24h Range):  Pulse:  [77] 77  Resp:  [18] 18  BP: (129)/(77) 129/77        There is no height or weight on file to calculate BMI.    FHT: 135 doppler - no decels   CTX: Q 3-5 minutes    Physical Exam:    Constitutional: She is oriented to person, place, and time. She appears well-developed and well-nourished. No distress.    HENT:   Head: Normocephalic.    Eyes: Pupils are equal, round, and reactive to light.    Neck: Normal range of motion.    Cardiovascular: Normal rate and regular rhythm.     Pulmonary/Chest: Effort normal. No respiratory distress.        Abdominal: Soft.     Genitourinary: Vaginal discharge (copious clear amniotic fluid) found.   Genitourinary Comments: Vertex by sono              Musculoskeletal: Normal range of motion and moves all extremeties.       Neurological: She is alert and oriented to person, place, and time.    Skin: Skin is warm and dry. She is not diaphoretic.    Psychiatric: She has a normal mood and affect. Her behavior is normal. Judgment and thought content normal.       Cervix:  Dilation:  5  Effacement:  90  Station: -2  Presentation: Vertex     Significant Labs:  Lab Results   Component Value Date    GROUPTRH A POS 02/11/2020    HEPBSAG Negative 10/17/2019    STREPBCULT No Group B Streptococcus isolated 04/29/2020       I have personallly reviewed all pertinent lab results from the last 24 hours.

## 2020-06-03 NOTE — HPI
History of Present Illness:   Chris Alexander is a 25 y.o. female  at 41w1d with Estimated Date of Delivery: [facility] accompanied by boyfriend, Manuel. Expecting a girl!    Reports regular uterine contractions since 20 @ 2300. They are now 3 minutes apart and increasing in intensity and duration.  moderate contractions, active fetal movement, No vaginal bleeding , Yes loss of fluid.     Last ate granola at 0430, last drank water at currently.     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

## 2020-06-03 NOTE — PROGRESS NOTES
"LABOR NOTE    S:  Pt resting in the tub, breathing calmly through UCs. Appears to be coping very well. Reports feeling pressure with UCs.  Manuel is tubside and supportive.     O: BP (!) 119/56 (BP Location: Left arm, Patient Position: Lying)   Pulse 82   Temp 99.1 °F (37.3 °C) (Oral)   Resp 16   Ht 5' 6" (1.676 m)   Wt 68 kg (150 lb)   LMP 2019   SpO2 96%   Breastfeeding? No   BMI 24.21 kg/m²     GENERAL: Calm and appropriate affect  NEURO: Alert, oriented, normal speech  ABDOMEN: Nontender, Fundus palpates soft between UC's.  FHT: Baseline 145, moderate BTBV, no decels noted.   CTX: q 2 minutes  SVE: 9/90/-1 (BBOW palpable)      ASSESSMENT:   25 y.o.  IUP at 41w1d, FHT reassuring/ Cat 1    Patient Active Problem List   Diagnosis    Pregnancy    Birth Center Risk Assessment: 0- Meets birth center guidelines     LGSIL on Pap smear of cervix --- PP PAP    Normal labor         PLAN:  Anticipate   Continue intermittent EFM per protocol  Continue close Maternal/Fetal monitoring  Recheck 2 hours or PRN      Peggy Smith CNM    "

## 2020-06-04 VITALS
BODY MASS INDEX: 24.11 KG/M2 | HEIGHT: 66 IN | SYSTOLIC BLOOD PRESSURE: 111 MMHG | WEIGHT: 150 LBS | OXYGEN SATURATION: 97 % | RESPIRATION RATE: 18 BRPM | HEART RATE: 71 BPM | TEMPERATURE: 98 F | DIASTOLIC BLOOD PRESSURE: 59 MMHG

## 2020-06-04 LAB
BASOPHILS # BLD AUTO: 0.03 K/UL (ref 0–0.2)
BASOPHILS NFR BLD: 0.2 % (ref 0–1.9)
DIFFERENTIAL METHOD: ABNORMAL
EOSINOPHIL # BLD AUTO: 0 K/UL (ref 0–0.5)
EOSINOPHIL NFR BLD: 0.2 % (ref 0–8)
ERYTHROCYTE [DISTWIDTH] IN BLOOD BY AUTOMATED COUNT: 13.9 % (ref 11.5–14.5)
HCT VFR BLD AUTO: 35 % (ref 37–48.5)
HGB BLD-MCNC: 11.1 G/DL (ref 12–16)
IMM GRANULOCYTES # BLD AUTO: 0.12 K/UL (ref 0–0.04)
IMM GRANULOCYTES NFR BLD AUTO: 0.6 % (ref 0–0.5)
LYMPHOCYTES # BLD AUTO: 2.3 K/UL (ref 1–4.8)
LYMPHOCYTES NFR BLD: 12.3 % (ref 18–48)
MCH RBC QN AUTO: 28.9 PG (ref 27–31)
MCHC RBC AUTO-ENTMCNC: 31.7 G/DL (ref 32–36)
MCV RBC AUTO: 91 FL (ref 82–98)
MONOCYTES # BLD AUTO: 1.3 K/UL (ref 0.3–1)
MONOCYTES NFR BLD: 7.1 % (ref 4–15)
NEUTROPHILS # BLD AUTO: 14.8 K/UL (ref 1.8–7.7)
NEUTROPHILS NFR BLD: 79.6 % (ref 38–73)
NRBC BLD-RTO: 0 /100 WBC
PLATELET # BLD AUTO: 195 K/UL (ref 150–350)
PMV BLD AUTO: 11.4 FL (ref 9.2–12.9)
RBC # BLD AUTO: 3.84 M/UL (ref 4–5.4)
WBC # BLD AUTO: 18.58 K/UL (ref 3.9–12.7)

## 2020-06-04 PROCEDURE — 85025 COMPLETE CBC W/AUTO DIFF WBC: CPT

## 2020-06-04 PROCEDURE — 72200004 HC VAGINAL DELIVERY LEVEL I

## 2020-06-04 PROCEDURE — 36415 COLL VENOUS BLD VENIPUNCTURE: CPT

## 2020-06-04 PROCEDURE — 72200005 HC VAGINAL DELIVERY LEVEL II

## 2020-06-04 PROCEDURE — 72100003 HC LABOR CARE, EA. ADDL. 8 HRS

## 2020-06-04 NOTE — PLAN OF CARE
Pt in no apparent distress. VSS. Ambulating and voiding without difficulty. Breastfeeding independently. Pain well controlled with current pain regimen. No acute events this shift. No additional needs this time.

## 2020-06-04 NOTE — LACTATION NOTE
06/04/20 0910   Maternal Assessment   Breast Shape Bilateral:;round   Breast Density Bilateral:;soft   Areola Bilateral:;elastic   Nipples Bilateral:;everted;graspable   Left Nipple Symptoms tender   Right Nipple Symptoms bruised;tender   Maternal Infant Feeding   Maternal Emotional State assist needed;relaxed   Infant Positioning cross-cradle;laid back (ventral)   Signs of Milk Transfer audible swallow;infant jaw motion present   Pain with Feeding yes   Pain Location nipples, bilateral   Pain Description soreness   Latch Assistance yes   Pt desires early discharge. Baby laying on back, latching on/off shallow. Mom also has bruising to right areola. Asked permission to assist with deeper latch. Baby latched easily, good tugs/pulls, audible swallows. Encouraged STS and practice good position and attachment. Pt has lactation warmline for support.  number on board.

## 2020-06-04 NOTE — DISCHARGE SUMMARY
[facility]  Obstetrics  Discharge Summary      Patient Name: Chris Alexander  MRN: [id number]  Admission Date: 6/3/2020  Hospital Length of Stay: 1 days  Discharge Date and Time:  2020 10:35 AM  Attending Physician: Angi Post MD   Discharging Provider: Wendy D Gerhardt, CNM   Primary Care Provider: Primary Doctor No    HPI:   History of Present Illness:   Chris Alexander is a 25 y.o. female  at 41w1d with Estimated Date of Delivery: 20[facility] accompanied by boyfriend, Manuel. Expecting a girl!    Reports regular uterine contractions since 20 @ 2300. They are now 3 minutes apart and increasing in intensity and duration.  moderate contractions, active fetal movement, No vaginal bleeding , Yes loss of fluid.     Last ate granola at 0430, last drank water at currently.     This pregnancy has been complicated by the following:  Patient Active Problem List   Diagnosis    Pregnancy    Birth Center Risk Assessment: 0- Meets birth center guidelines     LGSIL on Pap smear of cervix --- PP PAP    Normal labor        Presentation: Vertex   Estimated Fetal Weight: 7 lbs    Birth Center Risk Assessment: 0- Meets birth center guidelines    0- CNM management in ABC  1- CNM management on L&D  2- Consultation with OB to develop  plan of care  3- Collaborative CNM/OB management with delivery on L&D   4- Permanent referral of care to MD              * No surgery found *     Hospital Course:   6/3/20 @ 0530 - Present to OB ED in normal labor with SROM @ 0430 for clear fluid - /-2. Admit to [facility] for expectant mgmt.   viable female infant at 1413, 1st degree vaginal laceration and bilateral labial/periurethral lacs repaired.   20- PP day #1, doing well, wants to go home if baby does. D/C'ed         Final Active Diagnoses:    Diagnosis Date Noted POA    PRINCIPAL PROBLEM:   (normal spontaneous vaginal delivery) [O80] 2020 Not Applicable    Obstetric  vaginal laceration without perineal laceration - 1st degree [O71.4] 06/03/2020 No    Obstetric labial laceration bilaterally, delivered, current hospitalization [O70.0] 06/03/2020 No    Third-stage postpartum hemorrhage [O72.0] 06/03/2020 No      Problems Resolved During this Admission:    Diagnosis Date Noted Date Resolved POA    Normal labor [O80, Z37.9] 06/03/2020 06/03/2020 Not Applicable        Significant Diagnostic Studies: reviewed      Feeding Method: breast    Immunizations     None          Delivery:    Episiotomy: None   Lacerations: 1st   Repair suture:     Repair # of packets: 3   Blood loss (ml): 650     Birth information:  YOB: 2022   Time of birth: 2:13 PM   Sex: female   Delivery type: Vaginal, Spontaneous   Gestational Age: 41w1d    Delivery Clinician:      Other providers:       Additional  information:  Forceps:    Vacuum:    Breech:    Observed anomalies      Living?:           APGARS  One minute Five minutes Ten minutes   Skin color:         Heart rate:         Grimace:         Muscle tone:         Breathing:         Totals: 9  9        Placenta: Delivered:       appearance    Pending Diagnostic Studies:     None          Discharged Condition: stable    Disposition: Home or Self Care    Follow Up:  Follow-up Information     [facility] In 6 weeks.    Specialty:  Obstetrics and Gynecology  Contact information:  [facility] [facility] [address] [phone]  Additional information:  [facility] 4th Floor   Please park in [address] and use [address] elevators               Patient Instructions:      Diet Adult Regular     Ice to affected area     Lifting restrictions     Notify your health care provider if you experience any of the following:  temperature >100.4     Notify your health care provider if you experience any of the following:  persistent nausea and vomiting or diarrhea     Notify  your health care provider if you experience any of the following:  severe uncontrolled pain     Notify your health care provider if you experience any of the following:  redness, tenderness, or signs of infection (pain, swelling, redness, odor or green/yellow discharge around incision site)     Notify your health care provider if you experience any of the following:  difficulty breathing or increased cough     Notify your health care provider if you experience any of the following:  severe persistent headache     Notify your health care provider if you experience any of the following:  worsening rash     Notify your health care provider if you experience any of the following:  persistent dizziness, light-headedness, or visual disturbances     Notify your health care provider if you experience any of the following:  increased confusion or weakness     Activity as tolerated     Medications:  Current Discharge Medication List      CONTINUE these medications which have NOT CHANGED    Details   prenatal 21/iron fu/folic acid (PRENATAL COMPLETE ORAL) Take by mouth.             Wendy D Gerhardt, CNM  Obstetrics  [facility]

## 2020-06-04 NOTE — DISCHARGE INSTRUCTIONS
Follow Up/Patient Instructions:   1. Reviewed postpartum recommendations and precautions ~ encouraged to call for fever, severe or increased pain in head, chest, abdomen, perineum or legs; heavy bleeding, foul smelling lochia, signs of depression, or any other concern. Reviewed postpartum precautions r/t high blood pressure ~ encouraged to call for HA, vision changes, epigastric discomfort, or abnormal swelling.  2. Rx provided: none  3. Contraception: considering IUD, but unsure, she got pregnant with an IUD (Renee), may try Mirena; will f/u at postpartum visit. Encouraged abstinence and pelvic rest for healing until after postpartum check-up.   4. Encouraged to continue PNV while breastfeeding or at least for 6 weeks postpartum. Continue Fe.  5. Car seat law will be reviewed with patient at discharge per protocol  6. RTO in 4-6 weeks or sooner prn.  7. Discharge home today with written and verbal postpartum instructions and precautions.     Mother to breastfeed infant 8 or more times in 24hrs on infant's cue until content, frequent skin to skin, and will avoid bottles and pacifiers.  Mother is to keep infant actively feeding by keeping infant stimulated and using breast compression. Mother ensure effective nursing by hearing infant swallows and feeling nice tugs and pulls. Latch should not be painful while nursing.  Mother to record all breastfeedings, voids and stools in breastfeeding guide. Mother to call LC for breastfeeding assistance or questions .  Refer breastfeeding guide for lactation education.       Breastfeeding Discharge Instructions       Feed the baby at the earliest sign of hunger or comfort  o Hands to mouth, sucking motions  o Rooting or searching for something to suck on  o Dont wait for crying - it is a sign of distress     The feedings may be 8-12 times per 24hrs and will not follow a schedule   Avoid pacifiers and bottles for the first 4 weeks   Alternate the breast you start the  feeding with, or start with the breast that feels the fullest   Switch breasts when the baby takes himself off the breast or falls asleep   Keep offering breasts until the baby looks full, no longer gives hunger signs, and stays asleep when placed on his back in the crib   If the baby is sleepy and wont wake for a feeding, put the baby skin-to-skin dressed in a diaper against the mothers bare chest   Sleep near your baby   The baby should be positioned and latched on to the breast correctly  o Chest-to-chest, chin in the breast  o Babys lips are flipped outward  o Babys mouth is stretched open wide like a shout  o Babys sucking should feel like tugging to the mother  - The baby should be drinking at the breast:  o You should hear swallowing or gulping throughout the feeding  o You should see milk on the babys lips when he comes off the breast  o Your breasts should be softer when the baby is finished feeding  o The baby should look relaxed at the end of feedings  o After the 4th day and your milk is in:  o The babys poop should turn bright yellow and be loose, watery, and seedy  o The baby should have at least 3-4 poops the size of the palm of your hand per day  o The baby should have at least 5-6 wet diapers per day  o The urine should be light yellow in color  You should drink when you are thirsty and eat a healthy diet when you are    hungry.     Take naps to get the rest you need.   Take medications and/or drink alcohol only with permission of your obstetrician    or the babys pediatrician.  You can also call the Infant Risk Center,   ([phone]), Monday-Friday, 8am-5pm Central time, to get the most   up-to-date evidence-based information on the use of medications during   pregnancy and breastfeeding.      The baby should be examined by a pediatrician at 3-5 days of age.  Once your   milk comes in, the baby should be gaining at least ½ - 1oz each day and should be back to birthweight no later  than 10-14 days of age.          Community Resources    [facility] Breastfeeding Warmline: [phone]   Local [facility] clinics: provide incentives and breastpumps to eligible mothers  La Leche League International (LLLI):  mother-to-mother support group website        www.lll.org  Local La Leche League mother-to-mother support groups:        www.V I O.[employer]        La Leche League [facility]   Dr. Albert Ochoa website for latch videos and general information:        www.breastfeedinginc.ca  Infant Risk Center is a call center that provides information about the safety of taking medications while breastfeeding.  Call [phone], M-F, 8am-5pm, [place].  International Lactation Consultant Association provides resources for assistance:        www.ilca.org  [place] Breastfeeding Coalition provides informationand resources for parents  and the community    www.LaBreastfeedingSupport.org     Anna Rocha is a mom-to-mom support group:                             www.nolanesting.com//breastfeedng-support/  Partners for Healthy Babies:  0-936-591-BABY(6352)  [employer]: a breastfeeding support group for women of color, 809.605.7344

## 2020-06-04 NOTE — PLAN OF CARE
Pt ambulating and voiding without difficulty. Patient safety maintained, side rails up, bed low and locked position.  Pain well controlled without PRN pain medication. Fundus midline, firm, with moderate lochia. VSS. Significant other at bedside; parents responding to infant cues and bonding appropriately. Will continue to monitor.

## 2020-06-16 ENCOUNTER — TELEPHONE (OUTPATIENT)
Dept: OBSTETRICS AND GYNECOLOGY | Facility: OTHER | Age: 25
End: 2020-06-16

## 2020-07-16 ENCOUNTER — POSTPARTUM VISIT (OUTPATIENT)
Dept: OBSTETRICS AND GYNECOLOGY | Facility: CLINIC | Age: 25
End: 2020-07-16
Payer: COMMERCIAL

## 2020-07-16 VITALS
DIASTOLIC BLOOD PRESSURE: 76 MMHG | SYSTOLIC BLOOD PRESSURE: 124 MMHG | BODY MASS INDEX: 21.36 KG/M2 | HEIGHT: 66 IN | WEIGHT: 132.94 LBS

## 2020-07-16 DIAGNOSIS — Z87.42 HX OF ABNORMAL CERVICAL PAP SMEAR: Primary | ICD-10-CM

## 2020-07-16 PROCEDURE — 0503F PR POSTPARTUM CARE VISIT: ICD-10-PCS | Mod: S$GLB,,, | Performed by: ADVANCED PRACTICE MIDWIFE

## 2020-07-16 PROCEDURE — 99999 PR PBB SHADOW E&M-EST. PATIENT-LVL III: ICD-10-PCS | Mod: PBBFAC,,, | Performed by: ADVANCED PRACTICE MIDWIFE

## 2020-07-16 PROCEDURE — 0503F POSTPARTUM CARE VISIT: CPT | Mod: S$GLB,,, | Performed by: ADVANCED PRACTICE MIDWIFE

## 2020-07-16 PROCEDURE — 88175 CYTOPATH C/V AUTO FLUID REDO: CPT

## 2020-07-16 PROCEDURE — 99999 PR PBB SHADOW E&M-EST. PATIENT-LVL III: CPT | Mod: PBBFAC,,, | Performed by: ADVANCED PRACTICE MIDWIFE

## 2020-07-16 NOTE — PROGRESS NOTES
YOB: 2022   Time of birth: 2:13 PM   Sex: female   Head Delivery Date/Time: 6/3/2020  2:12 PM   Delivery type: Vaginal, Spontaneous   Gestational Age: 41w1d    Chris Alexander is a 25 y.o.  is here for a postpartum visit.  Estimated Date of Delivery: 20     DELIVERY HISTORY   delivery on 6/3/2020 at 41 gestation, female infant, First degree laceration with repair. Breastfeeding infant.     C/C: Postpartum exam. No complaints, Contraceptive consult.    Pregnancy was c/b by:  n/a    HPI:  Doing doing well; ambulating and tolerating regular diet  Lochia: resolved, alba currently   Vaginal pain: denies  Abdominal pain: denies   Breasts/nipples:  breastfeeding well without difficulty and infant is gaining appropriately per their pediatrician  Bowel/bladder function: no complaints  Depression/anxiety: doing well ; EPDS score: 1  Support at home: excellent family support from partner and extended family  Contraception: considering     Pap hx: LGSIL needs repap today    Past Medical History:   Diagnosis Date    LGSIL on Pap smear of cervix 2019     History reviewed. No pertinent surgical history.  Review of patient's allergies indicates:   Allergen Reactions    Pistachio nut Hives and Swelling    Apple Hives, Itching, Palpitations and Swelling    Nuts [tree nut]        Current Outpatient Medications:     prenatal 21/iron fu/folic acid (PRENATAL COMPLETE ORAL), Take by mouth., Disp: , Rfl:     PE:  OB History    Para Term  AB Living   1 1 1     1   SAB TAB Ectopic Multiple Live Births         0 1      # Outcome Date GA Lbr Frank/2nd Weight Sex Delivery Anes PTL Lv   1 Term 20 41w1d  3.742 kg (8 lb 4 oz) F Vag-Spont None N AMADA      There were no vitals filed for this visit.  LMP 2019   Gen: A&O x 4, NAD  Neck: non-tender, not enlarged, no masses or nodules  CV: normal HR  Lungs: normal resp effort  Breasts: lactating , bilaterally:  no masses, non-tender, nipples  intact  Abdomen: soft, nontender, and nondistended,   Vulva/Vagina: healed well, approximated, non-tender.   Speculum: cervix appears closed, no lesions, minimal discharge  Bimanual: uterus involuted, non-tender, neg CMT, adnexa free bilaterally  Pap Smear today? yes    ASSESSMENT/PLAN:  Postpartum exam s/p  delivery  -- Counseling regarding resuming normal activities of exercise and work.  -- Postpartum precautions reviewed  Hx of ABNORMAL pap LGSIL--repeated today  Continue annual exams; return then or sooner if any symptoms of pp depression or any other problem.    Birth control counseling  --- Desires Mirena IUD   Breast Feeding  -- Continue PNV while breastfeeding.        UPDATED MED LIST:  Current Outpatient Medications   Medication Sig Dispense Refill    prenatal 21/iron fu/folic acid (PRENATAL COMPLETE ORAL) Take by mouth.       No current facility-administered medications for this visit.          Erica Plascencia CNM, MSN  2020  10:40 AM

## 2020-07-29 LAB
FINAL PATHOLOGIC DIAGNOSIS: NORMAL
Lab: NORMAL

## 2020-08-03 PROBLEM — Z13.9 RISK AND FUNCTIONAL ASSESSMENT: Status: RESOLVED | Noted: 2019-12-17 | Resolved: 2020-08-03

## 2020-09-02 ENCOUNTER — PROCEDURE VISIT (OUTPATIENT)
Dept: OBSTETRICS AND GYNECOLOGY | Facility: CLINIC | Age: 25
End: 2020-09-02
Payer: COMMERCIAL

## 2020-09-02 VITALS
WEIGHT: 130.31 LBS | BODY MASS INDEX: 21.03 KG/M2 | SYSTOLIC BLOOD PRESSURE: 122 MMHG | DIASTOLIC BLOOD PRESSURE: 84 MMHG

## 2020-09-02 DIAGNOSIS — Z97.5 IUD CONTRACEPTION: Primary | ICD-10-CM

## 2020-09-02 PROBLEM — Z34.90 PREGNANCY: Status: RESOLVED | Noted: 2019-12-17 | Resolved: 2020-09-02

## 2020-09-02 LAB
B-HCG UR QL: NEGATIVE
CTP QC/QA: YES

## 2020-09-02 PROCEDURE — 58300 INSERTION OF IUD: ICD-10-PCS | Mod: S$GLB,,, | Performed by: ADVANCED PRACTICE MIDWIFE

## 2020-09-02 PROCEDURE — 58300 INSERT INTRAUTERINE DEVICE: CPT | Mod: S$GLB,,, | Performed by: ADVANCED PRACTICE MIDWIFE

## 2020-09-02 NOTE — PROCEDURES
Insertion of IUD    Date/Time: 9/2/2020 10:40 AM  Performed by: Erica Plascencia CNM  Authorized by: Erica Plascencia CNM     Consent:     Consent obtained:  Verbal    Consent given by:  Patient    Procedure risks and benefits discussed: yes      Patient questions answered: yes      Patient agrees, verbalizes understanding, and wants to proceed: yes      Educational handouts given: no      Instructions and paperwork completed: yes    Procedure:     Pelvic exam performed: no      Negative GC/chlamydia test: yes      Negative urine pregnancy test: yes      Negative serum pregnancy test: no      Cervix cleaned and prepped: yes      Speculum placed in vagina: yes      Tenaculum applied to cervix: yes      Uterus sounded: yes      Uterus sound depth (cm):  8    IUD inserted with no complications: yes      IUD type:  Mirena    Strings trimmed: yes    Post-procedure:     Patient tolerated procedure well: yes      Patient will follow up after next period: yes    Comments:      IUD INSERTION OFFICE NOTE    CC:   Pt presents today for Mirena IUD insertion.  She is using nothing for contraception and denies unprotected intercourse. Patient's last menstrual period was 08/20/2019..     HPI:    Pt is in a monogamous relationship and understands infection risk if that should change at some point in time. Reviewed risks and benefits of IUD use as well as insertion risks. Advised that like all surgical procedures there are risks including but not limited to bleeding, infection and uterine injury. That would include but not be limited to perforation with injury to surrounding organs. Discussed increased ectopic risk with pregnancy.    Patient continues to request Mirena IUD, and reports she has had all her questions answered to her satisfaction, written consent is signed. She would like to proceed with procedure.  She has history of IUD that was partially expulsed/ improper placement (possibly moved after insertion) and  pregnancy  Plan for sonogram at follow up visit  Review of patient's allergies indicates:   -- Pistachio nut -- Hives and Swelling   -- Apple -- Hives, Itching, Palpitations and                            Swelling   -- Nuts (tree nut)     O:  VS WNL  UPT negative    IUD INSERTION PROCEDURE NOTE    Time out was performed prior to IUD insertion.    The pt was placed in the lithotomy position. The cervix was visualized using a speculum and iodine/betadine was used to clean the cervix.  A single toothed tenaculum was applied to the anterior lip of the cervix.  The uterus was sounded to a depth of  8 cm. The IUD was prepared for insertion using the 's instructions. IUD depth marker adjusted according to the measurement obtained with the uterine sound. IUD inserted and insertor removed.  Tenaculum removed from the cervix.  IUD string clipped leaving approximately 4-5 cm of string visible. Bleeding controlled with pressure and  silver nitrate - hemostasis achieved. Pt tolerated procedure well.     A:   Mirena IUD insertion, device in place    P:  --Reviewed risks and benefits of IUD use as well as insertion risks. Discussed expected side effects and bleeding patterns following insertion.   --Pt taught how to check IUD strings. Advised to avoid sexual intercourse for at least 3 days, use a back-up method for first week following insertion. Continue condom use or safe sex practices if any risk of exposure to STDs.   --Advised she may have spotting or bleeding for 3-6 months and may experience amenorrhea with the mirena or shraddha IUD.   --Patient instructed to call the office if she develops fever, foul smelling discharge, severe pelvic pain, or heavy bleeding, or if she has any questions or concerns.   --Return to office in one month for recheck of IUD, or if she cannot feel the strings or feels strings to be longer than expected..  -- RTC x 1 month, continue annual exams, sonogram ordered for f/u as  srini Plascencia CNM, MSN  09/02/2020  6:03 PM

## 2020-09-02 NOTE — Clinical Note
Please schedule 4 week string check and sonogram same day for IUD placement check   Thanks Erica SAINZ CNM, MSN  09/02/2020  6:06 PM

## 2020-10-05 ENCOUNTER — OFFICE VISIT (OUTPATIENT)
Dept: OBSTETRICS AND GYNECOLOGY | Facility: CLINIC | Age: 25
End: 2020-10-05
Payer: COMMERCIAL

## 2020-10-05 VITALS
SYSTOLIC BLOOD PRESSURE: 102 MMHG | DIASTOLIC BLOOD PRESSURE: 60 MMHG | WEIGHT: 129.63 LBS | BODY MASS INDEX: 20.92 KG/M2

## 2020-10-05 DIAGNOSIS — Z30.431 IUD CHECK UP: Primary | ICD-10-CM

## 2020-10-05 PROCEDURE — 3008F BODY MASS INDEX DOCD: CPT | Mod: CPTII,S$GLB,, | Performed by: ADVANCED PRACTICE MIDWIFE

## 2020-10-05 PROCEDURE — 99999 PR PBB SHADOW E&M-EST. PATIENT-LVL II: CPT | Mod: PBBFAC,,, | Performed by: ADVANCED PRACTICE MIDWIFE

## 2020-10-05 PROCEDURE — 99999 PR PBB SHADOW E&M-EST. PATIENT-LVL II: ICD-10-PCS | Mod: PBBFAC,,, | Performed by: ADVANCED PRACTICE MIDWIFE

## 2020-10-05 PROCEDURE — 3008F PR BODY MASS INDEX (BMI) DOCUMENTED: ICD-10-PCS | Mod: CPTII,S$GLB,, | Performed by: ADVANCED PRACTICE MIDWIFE

## 2020-10-05 PROCEDURE — 99214 OFFICE O/P EST MOD 30 MIN: CPT | Mod: S$GLB,,, | Performed by: ADVANCED PRACTICE MIDWIFE

## 2020-10-05 PROCEDURE — 99214 PR OFFICE/OUTPT VISIT, EST, LEVL IV, 30-39 MIN: ICD-10-PCS | Mod: S$GLB,,, | Performed by: ADVANCED PRACTICE MIDWIFE

## 2020-10-05 NOTE — PROGRESS NOTES
Chris Alexander is a 25 y.o. female  presents today for intrauterine device check up following placement of Mirena IUD 4 weeks ago.  She is not having problems with bleeding, cramping, fever or discharge. She is having persistent brown vaginal discharge - discussed. She is able to feel the strings. She has not yet resumed intercourse.    ROS:  GENERAL: No fever, chills, fatigability or weight loss.  VULVAR: No pain, no lesions and no itching.  VAGINAL: No relaxation, no itching, no odor, and no lesions.  ABDOMEN: No abdominal pain. Denies nausea. Denies vomiting. No diarrhea. No constipation  BREAST: Denies pain. No lumps. No discharge.  URINARY: No incontinence, no nocturia, no frequency and no dysuria.  CARDIOVASCULAR: No chest pain. No shortness of breath. No leg cramps.  NEUROLOGICAL: No headaches. No vision changes.      Review of patient's allergies indicates:   Allergen Reactions    Pistachio nut Hives and Swelling    Apple Hives, Itching, Palpitations and Swelling    Nuts [tree nut]        Current Outpatient Medications:     prenatal 21/iron fu/folic acid (PRENATAL COMPLETE ORAL), Take by mouth., Disp: , Rfl:     Current Facility-Administered Medications:     levonorgestreL 20 mcg/24 hours (5 yrs) 52 mg IUD 1 Intra Uterine Device, 1 Intra Uterine Device, Intrauterine, , Erica Plascencia CNM, 1 Intra Uterine Device at 20 1040    Past Medical History:   Diagnosis Date    LGSIL on Pap smear of cervix 2019     No past surgical history on file.  Social History     Tobacco Use    Smoking status: Never Smoker    Smokeless tobacco: Never Used   Substance Use Topics    Alcohol use: Not Currently    Drug use: Never     OB History    Para Term  AB Living   1 1 1     1   SAB TAB Ectopic Multiple Live Births         0 1      # Outcome Date GA Lbr Frank/2nd Weight Sex Delivery Anes PTL Lv   1 Term 20 41w1d  3.742 kg (8 lb 4 oz) F Vag-Spont None N AMADA       /60   Wt 58.8 kg (129  lb 10.1 oz)   LMP  (LMP Unknown)   BMI 20.92 kg/m²     PHYSICAL EXAM:  GENERAL: Calm and appropriate affect, alert, oriented x4  SKIN: Color appropriate for race, warm and dry, clean and intact with no rashes.  RESP: Even, unlabored breathing  ABDOMEN: Soft, nontender.  :   Normal external female genitalia without lesions. Normal hair distribution. Adequate perineal body, normal urethral meatus.  Vagina pink and well rugated, no lesions, brown vaginal discharge.  Cervix pink without discharge or lesions, no cervical motion tenderness.  IUD threads were visualized and palpable.  Uterus and adnexa nontender.    ASSESSMENT / PLAN:  No diagnosis found.  Patient was counseled today on how to regularly check for IUD threads, along with warning signs and symptoms to follow up for.      FOLLOW UP:   Routine GYN exam in 1 year

## 2021-04-28 ENCOUNTER — PROCEDURE VISIT (OUTPATIENT)
Dept: OBSTETRICS AND GYNECOLOGY | Facility: CLINIC | Age: 26
End: 2021-04-28
Payer: COMMERCIAL

## 2021-04-28 VITALS
BODY MASS INDEX: 18.56 KG/M2 | SYSTOLIC BLOOD PRESSURE: 110 MMHG | HEIGHT: 66 IN | DIASTOLIC BLOOD PRESSURE: 60 MMHG | WEIGHT: 115.5 LBS

## 2021-04-28 DIAGNOSIS — Z30.431 IUD CHECK UP: ICD-10-CM

## 2021-04-28 DIAGNOSIS — T83.9XXA COMPLICATION OF INTRAUTERINE DEVICE (IUD), UNSPECIFIED COMPLICATION, INITIAL ENCOUNTER: Primary | ICD-10-CM

## 2021-04-28 DIAGNOSIS — Z30.09 ENCOUNTER FOR COUNSELING REGARDING INITIATION OF OTHER CONTRACEPTIVE MEASURE: ICD-10-CM

## 2021-04-28 PROBLEM — R87.612 LGSIL ON PAP SMEAR OF CERVIX: Status: RESOLVED | Noted: 2019-01-01 | Resolved: 2021-04-28

## 2021-04-28 PROCEDURE — 99213 OFFICE O/P EST LOW 20 MIN: CPT | Mod: 25,S$GLB,, | Performed by: ADVANCED PRACTICE MIDWIFE

## 2021-04-28 PROCEDURE — 99213 PR OFFICE/OUTPT VISIT, EST, LEVL III, 20-29 MIN: ICD-10-PCS | Mod: 25,S$GLB,, | Performed by: ADVANCED PRACTICE MIDWIFE

## 2021-04-28 PROCEDURE — 58301 REMOVE INTRAUTERINE DEVICE: CPT | Mod: S$GLB,,, | Performed by: ADVANCED PRACTICE MIDWIFE

## 2021-04-28 PROCEDURE — 58301 REMOVAL OF IUD: ICD-10-PCS | Mod: S$GLB,,, | Performed by: ADVANCED PRACTICE MIDWIFE

## 2021-04-28 RX ORDER — ACETAMINOPHEN AND CODEINE PHOSPHATE 120; 12 MG/5ML; MG/5ML
1 SOLUTION ORAL DAILY
Qty: 30 TABLET | Refills: 11 | Status: SHIPPED | OUTPATIENT
Start: 2021-04-28 | End: 2023-02-16

## 2023-02-14 NOTE — PROGRESS NOTES
OBSTETRICS AND GYNECOLOGY    Subjective:      Chief Complaint:  Irregular Menses    HPI:  Chris Alexander is an 27 y.o.  presenting with concerns of irregular menses. Using jose to track. Menses have been at 25, 30, 23, and 24 day intervals for past 4 months. On OCPs in the past, so periods felt more predictable then. No change in diet, sleep, and no new medications. No big change in stress level, though does have a 3 year old! Also, last period was heavier than typical. Uses menstrual cup and this last period had overflow. Some slight cramping typically, also worse with this last menses. Jose seems reliable with ovulation time based upon discharge. Not on contraception currently, open to fertility, rec start PNV. Denies abnormal vaginal odor, rash, itch, or discharge. Denies constipation, change in bowel habits. Denies dysuria, hematuria, or UTI sensation.  Previously Mirena IUD, removed 2021 in setting of partial expulsion.    Review of systems:  Denies constipation, diarrhea, abnormal vaginal discharge, or dysuria.     OB History    Para Term  AB Living   1 1 1     1   SAB IAB Ectopic Multiple Live Births         0 1      # Outcome Date GA Lbr Frank/2nd Weight Sex Delivery Anes PTL Lv   1 Term 20 41w1d  3.742 kg (8 lb 4 oz) F Vag-Spont None N AMADA     Past Medical History:   Diagnosis Date    IUD Expulsion x 2 2019 (resulted in pregnancy),  10 months after placement (not a GOOD candidate for IUD)    LGSIL on Pap smear of cervix     repeat pap smear 2020 NORMAL    Third-stage postpartum hemorrhage 6/3/2020     History reviewed. No pertinent surgical history.  Family History   Problem Relation Age of Onset    Diabetes Maternal Grandmother        Allergies:   Review of patient's allergies indicates:   Allergen Reactions    Pistachio nut Hives and Swelling    Apple Hives, Itching, Palpitations and Swelling    Nuts [tree nut]        Medications:   No current outpatient medications  "on file.     No current facility-administered medications for this visit.       Social History     Tobacco Use    Smoking status: Never    Smokeless tobacco: Never   Substance Use Topics    Alcohol use: Not Currently       Objective:   /80 (BP Location: Right arm, Patient Position: Sitting)   Ht 5' 6" (1.676 m)   Wt 54 kg (119 lb 0.8 oz)   LMP 02/02/2023 (Exact Date)   BMI 19.21 kg/m²   Physical Exam    GENERAL: Alert, well dressed, well nourished. Appropriate mood and affect. Pleasant.  HEENT: Normocephalic, atraumatic. Extraocular movements intact. Hearing and vision grossly intact.   PULMONARY: No respiratory distress. No use of accessory muscles of respiration. No cyanosis. Speaking comfortably in full sentences.   CARDIAC: Well perfused.    ABDOMEN: Soft. Nonacute.  EXTREMITIES: No edema. No visible rashes.     PELVIC:   EXTERNAL GENITALIA: No lesions or masses, non-erythematous.  URETHRA: Patent, no discharge.   VAGINA: Pink, moist, rugated, minimal white/clear discharge.   CERVIX: Parous, no lesions or masses, os closed, no blood or discharge per os, no cervical motion tenderness.   UTERUS: Firm, mobile, 6-8 weeks size, non-tender.   ADNEXA: Non-tender, non-palpable.    Assessment/Plan:     Problem List Items Addressed This Visit    None  Visit Diagnoses       Abnormal uterine bleeding (AUB)    -  Primary    Relevant Orders    POCT Urine Pregnancy    US Pelvis Comp with Transvag NON-OB (xpd    Vaginosis Screen by DNA Probe    C. trachomatis/N. gonorrhoeae by AMP DNA [facility]; Cervicovaginal    Urinalysis    CULTURE, URINE        - Differential diagnosis: pregnancy, leiomyoma, adenomyosis, endometrial/endocervical polyp, anovulation (stress, PCOS), atrophy, chronic endometritis, cervicitis, medications, coagulopathy, ovulatory dysfunction, and endocrine  - Vital signs within normal limits  - Physical exam as above, non-acute abdomen  - LPS (7/2020):  NILM  History of LSIL 10/2019  - UPT:  negative   - " Affirm, GCCT, UA/culture obtained  - Pelvic US ordered  - Obtain CBC, TSH, consider EMB if no improvement  - Start PNV  - RTC for follow up        As of 2021, the Cures Act has been passed nationally. This new law requires that all doctors progress notes, lab results, pathology reports and radiology reports be released IMMEDIATELY to the patient in the patient portal. That means that the results are released to you at the EXACT same time they are released to me. Therefore, with all of the patients that I have I am not able to reply to each patient exactly when the results come in. So there will be a delay from when you see the results to when I see them and have time to come up with a response to send you. Also I only see these results when I am on the computer at work. So if the results come in over the weekend or after 5 pm of a work day, I will not see them until the next business day. As you can tell, this is a challenge as a physician to give every patient the quick response they hope for and deserve. So please be patient!   Thanks for your understanding and patience.    Answers submitted by the patient for this visit:  Vaginal Pain Questionnaire (Submitted on 2023)  Chief Complaint: Vaginal pain  Chronicity: new  Onset: in the past 7 days  Frequency: every several days  Progression since onset: waxing and waning  Pain severity: mild  Affected side: both  Pregnant now?: No  abdominal pain: No  anorexia: No  chills: No  discolored urine: No  dysuria: No  fever: No  frequency: No  nausea: No  painful intercourse: No  rash: No  urgency: No  vomiting: No  Please select the characteristics of your discharge: : normal, brown  Aggravated by: nothing  treatments tried: nothing, warm bath  Improvement on treatment: significant  Sexual activity: sexually active  Partner with STD symptoms: possible  Birth control: nothing  Menstrual history: irregular  STD: Yes  abdominal surgery: No   section:  No  Ectopic pregnancy: No  Endometriosis: No  herpes simplex: No  gynecological surgery: No  menorrhagia: Yes  metrorrhagia: Yes  miscarriage: No  ovarian cysts: No  perineal abscess: No  PID: No  terminated pregnancy: No  vaginosis: No

## 2023-02-16 ENCOUNTER — OFFICE VISIT (OUTPATIENT)
Dept: OBSTETRICS AND GYNECOLOGY | Facility: CLINIC | Age: 28
End: 2023-02-16
Payer: COMMERCIAL

## 2023-02-16 VITALS
WEIGHT: 119.06 LBS | HEIGHT: 66 IN | BODY MASS INDEX: 19.13 KG/M2 | SYSTOLIC BLOOD PRESSURE: 112 MMHG | DIASTOLIC BLOOD PRESSURE: 80 MMHG

## 2023-02-16 DIAGNOSIS — N93.9 ABNORMAL UTERINE BLEEDING (AUB): Primary | ICD-10-CM

## 2023-02-16 LAB
B-HCG UR QL: NEGATIVE
BILIRUB UR QL STRIP: NEGATIVE
CLARITY UR REFRACT.AUTO: CLEAR
COLOR UR AUTO: COLORLESS
CTP QC/QA: YES
GLUCOSE UR QL STRIP: NEGATIVE
HGB UR QL STRIP: NEGATIVE
KETONES UR QL STRIP: NEGATIVE
LEUKOCYTE ESTERASE UR QL STRIP: NEGATIVE
NITRITE UR QL STRIP: NEGATIVE
PH UR STRIP: 6 [PH] (ref 5–8)
PROT UR QL STRIP: NEGATIVE
SP GR UR STRIP: 1 (ref 1–1.03)
URN SPEC COLLECT METH UR: ABNORMAL

## 2023-02-16 PROCEDURE — 3074F SYST BP LT 130 MM HG: CPT | Mod: CPTII,S$GLB,, | Performed by: STUDENT IN AN ORGANIZED HEALTH CARE EDUCATION/TRAINING PROGRAM

## 2023-02-16 PROCEDURE — 99999 PR PBB SHADOW E&M-EST. PATIENT-LVL III: ICD-10-PCS | Mod: PBBFAC,,, | Performed by: STUDENT IN AN ORGANIZED HEALTH CARE EDUCATION/TRAINING PROGRAM

## 2023-02-16 PROCEDURE — 81514 NFCT DS BV&VAGINITIS DNA ALG: CPT | Performed by: STUDENT IN AN ORGANIZED HEALTH CARE EDUCATION/TRAINING PROGRAM

## 2023-02-16 PROCEDURE — 3079F DIAST BP 80-89 MM HG: CPT | Mod: CPTII,S$GLB,, | Performed by: STUDENT IN AN ORGANIZED HEALTH CARE EDUCATION/TRAINING PROGRAM

## 2023-02-16 PROCEDURE — 3008F PR BODY MASS INDEX (BMI) DOCUMENTED: ICD-10-PCS | Mod: CPTII,S$GLB,, | Performed by: STUDENT IN AN ORGANIZED HEALTH CARE EDUCATION/TRAINING PROGRAM

## 2023-02-16 PROCEDURE — 81025 POCT URINE PREGNANCY: ICD-10-PCS | Mod: S$GLB,,, | Performed by: STUDENT IN AN ORGANIZED HEALTH CARE EDUCATION/TRAINING PROGRAM

## 2023-02-16 PROCEDURE — 87591 N.GONORRHOEAE DNA AMP PROB: CPT | Performed by: STUDENT IN AN ORGANIZED HEALTH CARE EDUCATION/TRAINING PROGRAM

## 2023-02-16 PROCEDURE — 81003 URINALYSIS AUTO W/O SCOPE: CPT | Performed by: STUDENT IN AN ORGANIZED HEALTH CARE EDUCATION/TRAINING PROGRAM

## 2023-02-16 PROCEDURE — 1159F PR MEDICATION LIST DOCUMENTED IN MEDICAL RECORD: ICD-10-PCS | Mod: CPTII,S$GLB,, | Performed by: STUDENT IN AN ORGANIZED HEALTH CARE EDUCATION/TRAINING PROGRAM

## 2023-02-16 PROCEDURE — 3074F PR MOST RECENT SYSTOLIC BLOOD PRESSURE < 130 MM HG: ICD-10-PCS | Mod: CPTII,S$GLB,, | Performed by: STUDENT IN AN ORGANIZED HEALTH CARE EDUCATION/TRAINING PROGRAM

## 2023-02-16 PROCEDURE — 3079F PR MOST RECENT DIASTOLIC BLOOD PRESSURE 80-89 MM HG: ICD-10-PCS | Mod: CPTII,S$GLB,, | Performed by: STUDENT IN AN ORGANIZED HEALTH CARE EDUCATION/TRAINING PROGRAM

## 2023-02-16 PROCEDURE — 99213 OFFICE O/P EST LOW 20 MIN: CPT | Mod: S$GLB,,, | Performed by: STUDENT IN AN ORGANIZED HEALTH CARE EDUCATION/TRAINING PROGRAM

## 2023-02-16 PROCEDURE — 99999 PR PBB SHADOW E&M-EST. PATIENT-LVL III: CPT | Mod: PBBFAC,,, | Performed by: STUDENT IN AN ORGANIZED HEALTH CARE EDUCATION/TRAINING PROGRAM

## 2023-02-16 PROCEDURE — 1159F MED LIST DOCD IN RCRD: CPT | Mod: CPTII,S$GLB,, | Performed by: STUDENT IN AN ORGANIZED HEALTH CARE EDUCATION/TRAINING PROGRAM

## 2023-02-16 PROCEDURE — 81025 URINE PREGNANCY TEST: CPT | Mod: S$GLB,,, | Performed by: STUDENT IN AN ORGANIZED HEALTH CARE EDUCATION/TRAINING PROGRAM

## 2023-02-16 PROCEDURE — 87086 URINE CULTURE/COLONY COUNT: CPT | Performed by: STUDENT IN AN ORGANIZED HEALTH CARE EDUCATION/TRAINING PROGRAM

## 2023-02-16 PROCEDURE — 3008F BODY MASS INDEX DOCD: CPT | Mod: CPTII,S$GLB,, | Performed by: STUDENT IN AN ORGANIZED HEALTH CARE EDUCATION/TRAINING PROGRAM

## 2023-02-16 PROCEDURE — 99213 PR OFFICE/OUTPT VISIT, EST, LEVL III, 20-29 MIN: ICD-10-PCS | Mod: S$GLB,,, | Performed by: STUDENT IN AN ORGANIZED HEALTH CARE EDUCATION/TRAINING PROGRAM

## 2023-02-17 LAB
BACTERIA UR CULT: NO GROWTH
BACTERIAL VAGINOSIS DNA: NEGATIVE
C TRACH DNA SPEC QL NAA+PROBE: NOT DETECTED
CANDIDA GLABRATA DNA: NEGATIVE
CANDIDA KRUSEI DNA: NEGATIVE
CANDIDA RRNA VAG QL PROBE: NEGATIVE
N GONORRHOEA DNA SPEC QL NAA+PROBE: NOT DETECTED
T VAGINALIS RRNA GENITAL QL PROBE: NEGATIVE

## 2023-07-28 ENCOUNTER — TELEPHONE (OUTPATIENT)
Dept: OBSTETRICS AND GYNECOLOGY | Facility: CLINIC | Age: 28
End: 2023-07-28
Payer: COMMERCIAL

## 2023-07-31 ENCOUNTER — TELEPHONE (OUTPATIENT)
Dept: OBSTETRICS AND GYNECOLOGY | Facility: CLINIC | Age: 28
End: 2023-07-31
Payer: COMMERCIAL

## 2023-08-01 DIAGNOSIS — O36.80X0 PREGNANCY WITH UNCERTAIN FETAL VIABILITY, SINGLE OR UNSPECIFIED FETUS: Primary | ICD-10-CM

## 2023-08-03 NOTE — PROGRESS NOTES
"Chief Complaint   Patient presents with    Routine Prenatal Visit       24 y.o., at 17w0d by Estimated Date of Delivery: 20    Doing well.  TWG: 3 lbs  BMI  -- Discussed IOM recommended weight gain of:   Normal Weight 18.5-24.9  25-35   -- Discussed criteria for delivery at [facility] r/t excessive pre-preg weight or excessive weight gain:   Pre-pregnancy BMI over 40 or excess pregnancy weight gain defined as:   Pre-preg BMI 18.5-24.9;  Excess weight gain = > 53 pounds    ROS  OBSTETRICS:   Contractions No   Bleeding No   Loss of fluid No   Fetal mvmnt Yes  GASTRO:   Nausea No   Vomiting No      OB History    Para Term  AB Living   1             SAB TAB Ectopic Multiple Live Births                  # Outcome Date GA Lbr Frank/2nd Weight Sex Delivery Anes PTL Lv   1 Current                Dating reviewed  Allergies and problem list reviewed and updated  Medical and surgical history reviewed  Prenatal labs reviewed and updated    PHYSICAL EXAM  /64   Ht 5' 6" (1.676 m)   Wt 59.2 kg (130 lb 10 oz)   LMP 2019   BMI 21.08 kg/m²     GENERAL: No acute distress  HEENT: Normocephalic, atraumatic  NEURO: Alert and oriented x3  PSYCH: Normal mood and affect  PULMONARY: Non-labored respiration; no tachypnea  ABD: Soft, gravid, nontender; no hernia or hepatosplenomegaly  FH below umbilicus    ASSESSMENT AND PLAN    ARIADNA MATUTE Problems (from 19 to present)     No problems associated with this episode.            Ordered anatomy US  Reviewed wt gain parameters for ABC, along with exercise/diet recommendations in pregnancy.  Education regarding  labor warning s/s.  Confirmed after-hours number given to patient.  Discussed travel precautions.    Reviewed warning signs, normal FM, and how/when to call.    Follow-up: 4 weeks, call or present sooner PRN    " Pt allergies verified pt medicated per [facility], cardiac monitor in place call bell in reach.  10/10 generalized pain       Gurmeet Garcia RN  08/03/23 [id number]

## 2023-08-24 ENCOUNTER — OFFICE VISIT (OUTPATIENT)
Dept: OBSTETRICS AND GYNECOLOGY | Facility: CLINIC | Age: 28
End: 2023-08-24
Payer: COMMERCIAL

## 2023-08-24 ENCOUNTER — HOSPITAL ENCOUNTER (OUTPATIENT)
Dept: PERINATAL CARE | Facility: OTHER | Age: 28
Discharge: HOME | End: 2023-08-24
Payer: COMMERCIAL

## 2023-08-24 VITALS
DIASTOLIC BLOOD PRESSURE: 70 MMHG | BODY MASS INDEX: 20.35 KG/M2 | HEIGHT: 66 IN | SYSTOLIC BLOOD PRESSURE: 110 MMHG | WEIGHT: 126.63 LBS

## 2023-08-24 DIAGNOSIS — Z34.90 PREGNANCY, UNSPECIFIED GESTATIONAL AGE: ICD-10-CM

## 2023-08-24 DIAGNOSIS — O36.80X0 PREGNANCY WITH UNCERTAIN FETAL VIABILITY, SINGLE OR UNSPECIFIED FETUS: ICD-10-CM

## 2023-08-24 DIAGNOSIS — Z32.01 POSITIVE PREGNANCY TEST: Primary | ICD-10-CM

## 2023-08-24 LAB
B-HCG UR QL: POSITIVE
CTP QC/QA: YES

## 2023-08-24 PROCEDURE — 99999 PR PBB SHADOW E&M-EST. PATIENT-LVL III: CPT | Mod: PBBFAC,,,

## 2023-08-24 PROCEDURE — 76801 OB US < 14 WKS SINGLE FETUS: CPT | Mod: 26,,, | Performed by: OBSTETRICS & GYNECOLOGY

## 2023-08-24 PROCEDURE — 3078F DIAST BP <80 MM HG: CPT | Mod: CPTII,S$GLB,,

## 2023-08-24 PROCEDURE — 3074F PR MOST RECENT SYSTOLIC BLOOD PRESSURE < 130 MM HG: ICD-10-PCS | Mod: CPTII,S$GLB,,

## 2023-08-24 PROCEDURE — 76801 OB US < 14 WKS SINGLE FETUS: CPT

## 2023-08-24 PROCEDURE — 3008F BODY MASS INDEX DOCD: CPT | Mod: CPTII,S$GLB,,

## 2023-08-24 PROCEDURE — 3008F PR BODY MASS INDEX (BMI) DOCUMENTED: ICD-10-PCS | Mod: CPTII,S$GLB,,

## 2023-08-24 PROCEDURE — 3074F SYST BP LT 130 MM HG: CPT | Mod: CPTII,S$GLB,,

## 2023-08-24 PROCEDURE — 87591 N.GONORRHOEAE DNA AMP PROB: CPT

## 2023-08-24 PROCEDURE — 99214 OFFICE O/P EST MOD 30 MIN: CPT | Mod: S$GLB,,,

## 2023-08-24 PROCEDURE — 87086 URINE CULTURE/COLONY COUNT: CPT

## 2023-08-24 PROCEDURE — 99999 PR PBB SHADOW E&M-EST. PATIENT-LVL III: ICD-10-PCS | Mod: PBBFAC,,,

## 2023-08-24 PROCEDURE — 3078F PR MOST RECENT DIASTOLIC BLOOD PRESSURE < 80 MM HG: ICD-10-PCS | Mod: CPTII,S$GLB,,

## 2023-08-24 PROCEDURE — 81025 POCT URINE PREGNANCY: ICD-10-PCS | Mod: S$GLB,,,

## 2023-08-24 PROCEDURE — 99214 PR OFFICE/OUTPT VISIT, EST, LEVL IV, 30-39 MIN: ICD-10-PCS | Mod: S$GLB,,,

## 2023-08-24 PROCEDURE — 81025 URINE PREGNANCY TEST: CPT | Mod: S$GLB,,,

## 2023-08-24 PROCEDURE — 76801 US OB/GYN PROCEDURE (VIEWPOINT): ICD-10-PCS | Mod: 26,,, | Performed by: OBSTETRICS & GYNECOLOGY

## 2023-08-24 NOTE — PROGRESS NOTES
Chris Alexander is a 28 y.o. , presents today for amenorrhea.      Patient reports has not seen any other provider for this pregnancy. Has Ultrasound scheduled for immediately after this visit, so short on time for this visit.    HPI: Reports amenorrhea since Patient's last menstrual period was 2023 (exact date).. Prior to LMP, menses were regular occuring every 28-30 days.  She is not currently on any contraception.  Also reports mild nausea, no vomiting. Has noticed breast tenderness. Had some mild spotting around 5 weeks.    SOCIAL HISTORY: Denies emotional/mental/physical/sexual violence or abuse. Feels safe at home. Accompanied today by Manuel. Second pregnancy, second baby for both.   Discussed use of alcohol, tobacco, and illicit substances - pt denies during pregnancy.    PAP HISTORY: last pap 2020, result NILM, denies any history of abnormal pap smear or STDs.     Reports no long-term chronic medical conditions     Review of patient's allergies indicates:   Allergen Reactions    Pistachio nut Hives and Swelling    Apple Hives, Itching, Palpitations and Swelling    Nuts [tree nut]      Past Medical History:   Diagnosis Date    IUD Expulsion x 2 2019 (resulted in pregnancy),  10 months after placement (not a GOOD candidate for IUD)    LGSIL on Pap smear of cervix     repeat pap smear 2020 NORMAL    Third-stage postpartum hemorrhage 6/3/2020     History reviewed. No pertinent surgical history.  History reviewed. No pertinent surgical history.  OB History    Para Term  AB Living   2 1 1     1   SAB IAB Ectopic Multiple Live Births         0 1      # Outcome Date GA Lbr Frank/2nd Weight Sex Delivery Anes PTL Lv   2 Current            1 Term 20 41w1d  3.742 kg (8 lb 4 oz) F Vag-Spont None N AMADA     OB History          2    Para   1    Term   1            AB        Living   1         SAB        IAB        Ectopic        Multiple   0    Live Births   1                Social History     Socioeconomic History    Marital status: Significant Other   Tobacco Use    Smoking status: Never    Smokeless tobacco: Never   Substance and Sexual Activity    Alcohol use: Not Currently    Drug use: Never    Sexual activity: Yes     Partners: Male     Birth control/protection: None     Family History   Problem Relation Age of Onset    Diabetes Maternal Grandmother      Social History     Substance and Sexual Activity   Sexual Activity Yes    Partners: Male    Birth control/protection: None       GENETIC SCREENING   Patient's age 35 years or older as of estimated date of delivery? n  Neural tube defect (meningomyelocele, spina bifida, or anencephaly)? n  Down syndrome? n  Aren-Sachs (Ashkenazi Gnosticism, Cajun, German Hubbard)? n  Canavan disease (Ashkenazi Gnosticism)? n  Familial dysautonomia (Ashkenazi Gnosticism)? n  Sickle cell disease or trait ()? n  Hemophilia or other blood disorders? n  Cystic fibrosis? n  Muscular dystrophy? n  Fanrock's chorea? n  Thalassemia (Italian, Greek, Mediterranean, or  background) MCV less than 80? n  Congenital heart defect? n  Mental retardation/autism? n   If Yes, was person tested for Fragile X?   Other inherited genetic or chromosomal disorder? n  Maternal metabolic disorder (e.g. type 1 diabetes, PKU)? n  Patient or baby's father had a child with birth defects not listed above? n  Recurrent pregnancy loss or a stillbirth: n  Medications (including supplements, vitamins, herbs or OTC drugs)/illicit/recreational drugs/alcohol since last menstrual period? PNV   If yes, agent(s) and strength/dose:   List any other genetic risks:   Comments/counseling:     INFECTION HISTORY  Live with someone with TB or exposed to TB: no  Patient or partner has history of genital herpes: Partner has HSV - patient has not ever had outbreak  Rash or viral illness since last menstrual period: no  Patient or partner has hepatitis B or C: no  History of STD,  "gonorrhea, chlamydia, HPV, HIV, syphilis (list all that apply): no  List other infections:   Additional comments:     No, Primary Doctor     ROS:  Constitutional/Gen: Denies fevers, chills, malaise, or weight loss.  Psych: Denies depression, anxiety  Eyes: Denies changes in vision or scotomata  Ears, nose, mouth, throat: Denies sinus tenderness, swelling, or dentition problems  CV/vasc: Denies heart palpitations or edema  Resp: Denies SOB or dyspnea  Breasts: Denies mass, nipple discharge, or trauma.   GI: Denies constipation or diarrhea.  : Denies vaginal discharge, dysuria or pelvic pain.   MS: Denies weakness, soreness, or changes in ROM    OBJECTIVE:  /70   Ht 5' 6" (1.676 m)   Wt 57.5 kg (126 lb 10.5 oz)   LMP 2023 (Exact Date)   Breastfeeding No   BMI 20.44 kg/m²   Constitutional/Gen: NAD, appears stated age, well groomed  Neck: supple, no masses or enlargement  Head: normocephalic  Skin: warm and dry w/o rash  Lung: normal resp effort  Heart: normal HR   Declines full physical exam due to shortened visit -- Ultrasound scheduled for immediately after visit.  Neurologic: A&O x 4, non-focal, cranial nerves 2-12 grossly intact  Psych: affect appropriate and without signs of mood, thought or memory difficulty appreciated      UPT pos in office    ASSESSMENT:  28 y.o. female  with amenorrhea  Patient Active Problem List   Diagnosis    Lactating mother       PLAN:  Amenorrhea  -- + UPT in office, Patient's last menstrual period was 2023 (exact date). --> Estimated Date of Delivery: None noted.  -- Dating US ordered/scheduled  -- Routine serum and urine prenatal labs today    Physical exam today.   --Discussed ASCCP guidelines. Next pap due not later than 2023/postpartum     Pregnancy education and couseling; handouts and booklet provided  -- Oriented to practice and anticipated prenatal course of care and how to contact us  -- Reviewed ABC guidelines and admission, exclusion, and " transfer criteria  -- Precautions/warning signs reviewed  -- Common complaints of pregnancy  -- Routine prenatal labs including HIV  -- Ultrasounds  -- Childbirth education/hospital/ABC facilities  -- Nutrition, prepregnant BMI, and recommended weight gain  -- Toxoplasmosis precautions (Cats/Raw Meat)  -- Sexual activity and exercise  -- Environmental/Work hazards  -- Travel  -- Tobacco, as well as alcohol and drug use  -- Use of any medications (Including supplements, Vitamins, Herbs, or OTC Drugs)  -- Domestic violence screen neg    Vaccines:    - Reports has not had bivalent booster - discussed how and where to get      Body mass index is 20.44 kg/m².  -- Discussed IOM recommended weight gain of:   Weight category Pre pre BMI  Recommended TWG   Underweight Less than 18.5 28-40    Normal Weight 18.5-24.9  25-35    Overweight 25-29.9  15-25    Obese   30 and greater  11-20     Nausea in pregnancy  -- Education regarding lifestyle and dietary modifications  -- Reviewed use of B6/Unisom prn. Pt will notify us if no relief/worsening symptoms, will consider alternative therapies prn    AMA  -- Plan referral with MFM as pregnancy progresses - plan for growth ultrasound at approx 32-34w.        Reviewed genetic testing options.    -- Reviewed available first trimester and/or second  trimester screening options. Reviewed risk of false positive/negative results and recommendation of referral to MFM in event of a positive result, for NIPT, US, and/or amniocentesis.  -- Patient desires genetic screening. Mat 21    Reviewed warning signs, precautions, and how/when to contact us.     RTC x 4 weeks, call or present sooner prn.     Updated Medication List:  No current outpatient medications on file.     No current facility-administered medications for this visit.         Edilia Arana, DARRON/NP

## 2023-08-25 ENCOUNTER — TELEPHONE (OUTPATIENT)
Dept: OBSTETRICS AND GYNECOLOGY | Facility: CLINIC | Age: 28
End: 2023-08-25
Payer: COMMERCIAL

## 2023-08-25 ENCOUNTER — TELEPHONE (OUTPATIENT)
Dept: OBSTETRICS AND GYNECOLOGY | Facility: HOSPITAL | Age: 28
End: 2023-08-25
Payer: COMMERCIAL

## 2023-08-25 PROBLEM — O09.899 RUBELLA NON-IMMUNE STATUS, ANTEPARTUM: Status: ACTIVE | Noted: 2023-08-25

## 2023-08-25 PROBLEM — Z28.39 RUBELLA NON-IMMUNE STATUS, ANTEPARTUM: Status: ACTIVE | Noted: 2023-08-25

## 2023-08-25 LAB
BACTERIA UR CULT: NO GROWTH
C TRACH DNA SPEC QL NAA+PROBE: NOT DETECTED
N GONORRHOEA DNA SPEC QL NAA+PROBE: NOT DETECTED

## 2023-08-25 NOTE — TELEPHONE ENCOUNTER
Pt spouse called in regards to her falling down in the house with sheet rock. Please call and advise. Pt was just seen yesterday in the clinic for a pregnancy confirmation.

## 2023-08-25 NOTE — TELEPHONE ENCOUNTER
Pt called with regards to a fall today. She was moving when a slab of sheetrock fell on her, causing her to fall and land on her bottom. 8 weeks pregnant. SAB precautions given. Low suspicion for this. Denies cramping or vaginal bleeding. To go to main ER if concerns arise.

## 2023-09-20 ENCOUNTER — INITIAL PRENATAL (OUTPATIENT)
Dept: OBSTETRICS AND GYNECOLOGY | Facility: CLINIC | Age: 28
End: 2023-09-20
Payer: COMMERCIAL

## 2023-09-20 ENCOUNTER — PATIENT MESSAGE (OUTPATIENT)
Dept: ADMINISTRATIVE | Facility: OTHER | Age: 28
End: 2023-09-20
Payer: COMMERCIAL

## 2023-09-20 VITALS — BODY MASS INDEX: 20.74 KG/M2 | DIASTOLIC BLOOD PRESSURE: 72 MMHG | SYSTOLIC BLOOD PRESSURE: 110 MMHG | WEIGHT: 128.5 LBS

## 2023-09-20 DIAGNOSIS — Z34.81 ENCOUNTER FOR SUPERVISION OF OTHER NORMAL PREGNANCY IN FIRST TRIMESTER: Primary | ICD-10-CM

## 2023-09-20 PROCEDURE — 0500F INITIAL PRENATAL CARE VISIT: CPT | Mod: S$GLB,,, | Performed by: ADVANCED PRACTICE MIDWIFE

## 2023-09-20 PROCEDURE — 99999 PR PBB SHADOW E&M-EST. PATIENT-LVL II: ICD-10-PCS | Mod: PBBFAC,,, | Performed by: ADVANCED PRACTICE MIDWIFE

## 2023-09-20 PROCEDURE — 0500F PR INITIAL PRENATAL CARE VISIT: ICD-10-PCS | Mod: S$GLB,,, | Performed by: ADVANCED PRACTICE MIDWIFE

## 2023-09-20 PROCEDURE — 99999 PR PBB SHADOW E&M-EST. PATIENT-LVL II: CPT | Mod: PBBFAC,,, | Performed by: ADVANCED PRACTICE MIDWIFE

## 2023-09-20 NOTE — PROGRESS NOTES
Chief Complaint   Patient presents with    Initial Prenatal Visit       28 y.o.,  at 12w3d by  8w4d US    Patient presents today for a initial prenatal visit.  Patient has  completed a Meet & Greet tour of [facility].  She is here today with alone.  She reports she has not received flu vaccine.     Complaints today: none.  Doing well overall.     SOCIAL HISTORY: Denies emotional/mental/physical/sexual violence or abuse. Feels safe at home. Accompanied today by alone today. Lives  with father of baby, 2nd baby. 2nd pregnancy. 2nd   baby for both.       Patient reports her prepregnancy weight: 8lbs. TW lbs     Patient reports most recent pap smear: , results: neg    ROS  OBSTETRICS:   Contractions No   Bleeding No   Loss of fluid No   Fetal mvmnt:   Too early    GASTRO:   Nausea No   Vomiting No      OB History    Para Term  AB Living   2 1 1 0 0 1   SAB IAB Ectopic Multiple Live Births   0 0 0   1      # Outcome Date GA Lbr Frank/2nd Weight Sex Delivery Anes PTL Lv   2 Current            1 Term 20 41w1d  3.742 kg (8 lb 4 oz) F Vag-Spont None N AMADA       Dating reviewed  Allergies and problem list reviewed and updated  Medical and surgical history reviewed    PHYSICAL EXAM  /72   Wt 58.3 kg (128 lb 8.5 oz)   LMP 2023 (Exact Date)   BMI 20.74 kg/m²     GENERAL: No acute distress  HEENT: Normocephalic, atruamatic  NEURO: Alert and oriented x3  PSYCH: Calm, normal mood and affect  PULMONARY: Non-labored respiration; no tachypnea  ABD: Soft, gravid, nontender    ASSESSMENT AND PLAN     Problems (from 23 to present)       No problems associated with this episode.                Initial labs and dating u/s reviewed.   Counseled today on proper weight gain based on the [place] [employer]'s recommendations based on her pre-pregnancy weight. Discussed excessive weight gain allowance, which would risk her out of the ABC (and would plan for delivery on L&D), but not  midwifery care. Reviewed potential risks to excessive weight gain.  .BMI (prepregnancy)  -- Discussed IOM recommended weight gain of:   Weight category Pre pre BMI  Recommended TWG   Underweight Less than 18.5 28-40    Normal Weight 18.5-24.9  25-35    Overweight 25-29.9  15-25    Obese   30 and greater  11-20   -- Discussed criteria for delivery at [facility] r/t excessive pre-preg weight or excessive weight gain:   Pre-pregnancy BMI over 40 or excess pregnancy weight gain defined as:   Pre-preg BMI < 18.5; Excess weight gain = > 60 pound   Pre-preg BMI 18.5-24.9;  Excess weight gain = > 53 pounds   Pre-preg BMI 25-29.9;  Excess weight gain = > 38 pounds   Pre-preg BMI > 30;  Excess weight gain = > 30 pounds    Review exercise precautions in pregnancy, along with recommendations. She does  exercise regularly.   Discussed substances & foods to avoid in pregnancy (i.e. sushi, fish that are high in mercury, deli meat, and unpasteurized cheeses).   Discussed prenatal vitamin options (i.e. stool softener, DHA).    Education regarding CDC recommendations for TDAP in pregnancy, reviewed risks/benefits to this. Patient .  Patient was oriented to practice and progression of routine prenatal care.   Reviewed New OB Pregnancy packet & questions answered.    Reviewed aneuploidy screening options and indications, questions answered - patient wants Mat21.  Education regarding warning signs.      Follow up: 4 wks, call or present sooner prn.   Faustina Akins CNM, MS

## 2023-09-22 ENCOUNTER — PATIENT MESSAGE (OUTPATIENT)
Dept: MATERNAL FETAL MEDICINE | Facility: CLINIC | Age: 28
End: 2023-09-22
Payer: COMMERCIAL

## 2023-10-15 ENCOUNTER — PATIENT MESSAGE (OUTPATIENT)
Dept: OTHER | Facility: OTHER | Age: 28
End: 2023-10-15
Payer: COMMERCIAL

## 2023-10-18 ENCOUNTER — ROUTINE PRENATAL (OUTPATIENT)
Dept: OBSTETRICS AND GYNECOLOGY | Facility: CLINIC | Age: 28
End: 2023-10-18
Payer: COMMERCIAL

## 2023-10-18 VITALS — SYSTOLIC BLOOD PRESSURE: 110 MMHG | DIASTOLIC BLOOD PRESSURE: 68 MMHG | WEIGHT: 134.5 LBS | BODY MASS INDEX: 21.71 KG/M2

## 2023-10-18 DIAGNOSIS — Z3A.16 16 WEEKS GESTATION OF PREGNANCY: Primary | ICD-10-CM

## 2023-10-18 DIAGNOSIS — Z28.39 RUBELLA NON-IMMUNE STATUS, ANTEPARTUM: ICD-10-CM

## 2023-10-18 DIAGNOSIS — O09.899 RUBELLA NON-IMMUNE STATUS, ANTEPARTUM: ICD-10-CM

## 2023-10-18 PROCEDURE — 99999 PR PBB SHADOW E&M-EST. PATIENT-LVL II: ICD-10-PCS | Mod: PBBFAC,,,

## 2023-10-18 PROCEDURE — 99999 PR PBB SHADOW E&M-EST. PATIENT-LVL II: CPT | Mod: PBBFAC,,,

## 2023-10-18 PROCEDURE — 0502F PR SUBSEQUENT PRENATAL CARE: ICD-10-PCS | Mod: CPTII,S$GLB,,

## 2023-10-18 PROCEDURE — 0502F SUBSEQUENT PRENATAL CARE: CPT | Mod: CPTII,S$GLB,,

## 2023-10-18 NOTE — PROGRESS NOTES
Chief Complaint   Patient presents with    Routine Prenatal Visit       28 y.o.,  at 16w3d by US     Complaints today: None.  Doing well without concerns. Not feeling flutters/fetal movement yet.   TW lbs     ROS  OBSTETRICS:   Contractions No   Bleeding No   Loss of fluid No    GASTRO:   Nausea No   Vomiting No      OB History    Para Term  AB Living   2 1 1 0 0 1   SAB IAB Ectopic Multiple Live Births   0 0 0   1      # Outcome Date GA Lbr Frank/2nd Weight Sex Delivery Anes PTL Lv   2 Current            1 Term 20 41w1d  3.742 kg (8 lb 4 oz) F Vag-Spont None N AMADA       Dating reviewed  Allergies and problem list reviewed and updated  Medical and surgical history reviewed  Prenatal labs reviewed and updated    PHYSICAL EXAM  /68   Wt 61 kg (134 lb 7.7 oz)   LMP 2023 (Exact Date)   BMI 21.71 kg/m²     GENERAL: No acute distress  HEENT: Normocephalic, atruamatic  NEURO: Alert and oriented x3  PSYCH: Calm, normal mood and affect  PULMONARY: Non-labored respiration; no tachypnea  ABD: Soft, gravid, nontender  FH = senior care between PS & umbilicus    FHR: 145    ASSESSMENT AND PLAN     Problems (from 21 to present)       No problems associated with this episode.            Anatomy ultrasound with MFM ordered/discussed.    Reviewed warning signs and pregnancy precautions, along with how/when to call.  Follow up: 4 wks, call or present sooner prn.     Declines flu shot and Covid booster.     Pt VU and agrees with POC    FIDEL Craig CNM

## 2023-10-22 ENCOUNTER — PATIENT MESSAGE (OUTPATIENT)
Dept: OTHER | Facility: OTHER | Age: 28
End: 2023-10-22
Payer: COMMERCIAL

## 2023-10-25 ENCOUNTER — PATIENT MESSAGE (OUTPATIENT)
Dept: OBSTETRICS AND GYNECOLOGY | Facility: CLINIC | Age: 28
End: 2023-10-25
Payer: COMMERCIAL

## 2023-10-25 ENCOUNTER — HOSPITAL ENCOUNTER (EMERGENCY)
Facility: OTHER | Age: 28
Discharge: HOME | End: 2023-10-25
Attending: OBSTETRICS & GYNECOLOGY
Payer: COMMERCIAL

## 2023-10-25 VITALS
SYSTOLIC BLOOD PRESSURE: 106 MMHG | DIASTOLIC BLOOD PRESSURE: 62 MMHG | RESPIRATION RATE: 14 BRPM | HEART RATE: 79 BPM | TEMPERATURE: 98 F | OXYGEN SATURATION: 100 %

## 2023-10-25 DIAGNOSIS — O46.90 VAGINAL BLEEDING IN PREGNANCY: Primary | ICD-10-CM

## 2023-10-25 DIAGNOSIS — Z3A.17 17 WEEKS GESTATION OF PREGNANCY: ICD-10-CM

## 2023-10-25 PROCEDURE — 99284 EMERGENCY DEPT VISIT MOD MDM: CPT

## 2023-10-25 PROCEDURE — 99284 EMERGENCY DEPT VISIT MOD MDM: CPT | Mod: ,,, | Performed by: OBSTETRICS & GYNECOLOGY

## 2023-10-25 PROCEDURE — 99284 PR EMERGENCY DEPT VISIT,LEVEL IV: ICD-10-PCS | Mod: ,,, | Performed by: OBSTETRICS & GYNECOLOGY

## 2023-10-25 NOTE — ED PROVIDER NOTES
"Encounter Date: 10/25/2023       History     Chief Complaint   Patient presents with    Vaginal Bleeding     Ms. Alexander is a 29yo  at 17w3d with an IUP complicated by RNI status here this morning with concern for vaginal bleeding.  She reports waking up this morning with a small amount of vaginal bleeding, which she describes as a couple tablespoons and dark brown in nature.  She denies active PV bleeding, no contractions and no LoF.  She reports occasional fetal movement described as "fluttering."  She has no other concerns at this time.      Review of patient's allergies indicates:   Allergen Reactions    Pistachio nut Hives and Swelling    Apple Hives, Itching, Palpitations and Swelling    Nuts [tree nut]      Past Medical History:   Diagnosis Date    IUD Expulsion x 2 2019 (resulted in pregnancy),  10 months after placement (not a GOOD candidate for IUD)    LGSIL on Pap smear of cervix     repeat pap smear 2020 NORMAL    Third-stage postpartum hemorrhage 6/3/2020     No past surgical history on file.  Family History   Problem Relation Age of Onset    Diabetes Maternal Grandmother      Social History     Tobacco Use    Smoking status: Never    Smokeless tobacco: Never   Substance Use Topics    Alcohol use: Not Currently    Drug use: Never     Review of Systems   Constitutional:  Negative for chills and fever.   HENT:  Negative for congestion.    Eyes:  Negative for visual disturbance.   Respiratory:  Negative for cough and shortness of breath.    Cardiovascular:  Negative for chest pain and palpitations.   Gastrointestinal:  Negative for abdominal pain, diarrhea, nausea and vomiting.   Genitourinary:  Positive for vaginal bleeding. Negative for dysuria, genital sores and vaginal pain.   Skin:  Negative for rash.   Neurological:  Negative for headaches.       Physical Exam     Initial Vitals [10/25/23 1208]   BP Pulse Resp Temp SpO2   110/66 70 14 98.3 °F (36.8 °C) 100 %      MAP       --     "   Temp:  [98.3 °F (36.8 °C)]   Pulse:  [70-79]   Resp:  [14]   BP: (106-110)/(62-66)   SpO2:  [100 %]    Physical Exam    Vitals reviewed.  Constitutional: She appears well-developed and well-nourished. She is not diaphoretic. No distress.   HENT:   Head: Normocephalic and atraumatic.   Cardiovascular:  Normal rate.           Pulmonary/Chest: No respiratory distress.   Abdominal: There is no abdominal tenderness. There is no rebound and no guarding.   Musculoskeletal:         General: No edema.     Neurological: She is alert and oriented to person, place, and time.   Skin: Skin is warm and dry.   Psychiatric: She has a normal mood and affect. Her behavior is normal. Thought content normal.     OB LABOR EXAM:   Pre-Term Labor: No.   Membranes ruptured: No.   Method: Sterile speculum exam per MD.   Vaginal Bleeding: none present.     Dilatation: 0.       Amniotic Fluid Color: no fluid.           ED Course   Procedures  Labs Reviewed - No data to display       Imaging Results    None          Medications - No data to display  Medical Decision Making            Attending Attestation:   Physician Attestation Statement for Resident:  As the supervising MD   Physician Attestation Statement: I have personally seen and examined this patient.   I agree with the above history.  -:   As the supervising MD I agree with the above PE.     As the supervising MD I agree with the above treatment, course, plan, and disposition.   -: FHTs normal.  Patient denies recent exam or sexual activity.  No VB noted on speculum exam.  Patient reassured.  Agree with discharge to home.  Will f/u with OB in 1 week.  I was personally present during the critical portions of the procedure(s) performed by the resident and was immediately available in the ED to provide services and assistance as needed during the entire procedure.   I have reviewed the following: old records at this facility.                           Medical Decision Making:   History:    Old Medical Records: I decided to obtain old medical records.  Old Records Summarized: records from clinic visits.  ED Management:  VSS  FHTs 150s    Bedside U/S demonstrates IUP w/ posterior placenta; frequent fetal movements and fetal cardiac activity    SSE: No blood in vaginal vault; no active bleeding from cervical os; cervix visually closed    PE reassuring against acute obstetric process    Pt stable and amenable to discharge.  Return precautions given; pt voiced understanding.  Pt encouraged to f/u with her CNM w/I 1 week      Clinical Impression:   Final diagnoses:  [O46.90] Vaginal bleeding in pregnancy (Primary)  [Z3A.17] 17 weeks gestation of pregnancy        ED Disposition Condition    Discharge Stable          ED Prescriptions    None       Follow-up Information    None          Geetha Jarvis MD  10/26/23 6001

## 2023-10-25 NOTE — DISCHARGE INSTRUCTIONS
Call the ABC or L & D after hours at [phone] for vaginal bleeding, leakage of fluids, contractions 4-5 in one hour, decreased fetal movements (less than 10 kicks in 2 hours), headache not relieved by Tylenol, blurry vision, or temp of 100.4 or greater.  Begin doing fetal kick counts, at least 10 movements in 2 hours starting at 28 weeks gestation.  Keep next clinic appointment.

## 2023-11-07 ENCOUNTER — PROCEDURE VISIT (OUTPATIENT)
Dept: MATERNAL FETAL MEDICINE | Facility: CLINIC | Age: 28
End: 2023-11-07
Payer: COMMERCIAL

## 2023-11-07 ENCOUNTER — ROUTINE PRENATAL (OUTPATIENT)
Dept: OBSTETRICS AND GYNECOLOGY | Facility: CLINIC | Age: 28
End: 2023-11-07
Payer: COMMERCIAL

## 2023-11-07 ENCOUNTER — PATIENT MESSAGE (OUTPATIENT)
Dept: OBSTETRICS AND GYNECOLOGY | Facility: CLINIC | Age: 28
End: 2023-11-07

## 2023-11-07 VITALS
SYSTOLIC BLOOD PRESSURE: 110 MMHG | DIASTOLIC BLOOD PRESSURE: 60 MMHG | WEIGHT: 136.13 LBS | BODY MASS INDEX: 21.97 KG/M2

## 2023-11-07 DIAGNOSIS — Z34.81 ENCOUNTER FOR SUPERVISION OF OTHER NORMAL PREGNANCY IN FIRST TRIMESTER: ICD-10-CM

## 2023-11-07 DIAGNOSIS — Z3A.19 19 WEEKS GESTATION OF PREGNANCY: Primary | ICD-10-CM

## 2023-11-07 PROCEDURE — 0502F PR SUBSEQUENT PRENATAL CARE: ICD-10-PCS | Mod: CPTII,S$GLB,,

## 2023-11-07 PROCEDURE — 76805 OB US >/= 14 WKS SNGL FETUS: CPT | Mod: S$GLB,,, | Performed by: OBSTETRICS & GYNECOLOGY

## 2023-11-07 PROCEDURE — 99999 PR PBB SHADOW E&M-EST. PATIENT-LVL II: ICD-10-PCS | Mod: PBBFAC,,,

## 2023-11-07 PROCEDURE — 0502F SUBSEQUENT PRENATAL CARE: CPT | Mod: CPTII,S$GLB,,

## 2023-11-07 PROCEDURE — 76805 US MFM PROCEDURE (VIEWPOINT): ICD-10-PCS | Mod: S$GLB,,, | Performed by: OBSTETRICS & GYNECOLOGY

## 2023-11-07 PROCEDURE — 99999 PR PBB SHADOW E&M-EST. PATIENT-LVL II: CPT | Mod: PBBFAC,,,

## 2023-11-07 NOTE — PROGRESS NOTES
Reason for Visit   Routine Prenatal Visit    28 y.o.,  at 19w2d    Complaints today: None. Doing well without concerns.    Denies contractions, vaginal bleeding, leaking amniotic fluid.  Reports fetal movement.    TW lbs   Allergies: Pistachio nut, Apple, and Nuts [tree nut]    PHYSICAL EXAM  GENERAL: No acute distress  HEENT: Normocephalic, atruamatic  NEURO: Alert and oriented x3  PSYCH: Calm, normal mood and affect  PULMONARY: Non-labored respiration; no tachypnea  ABD: Soft, gravid, nontender  FH = umbilicus  FHR: 145    ASSESSMENT AND PLAN  19 weeks gestation of pregnancy      Anatomy ultrasound today--report pending.   Reviewed exercise/diet recommendations in pregnancy.  Encouraged prenatal education classes - schedule given.  Education regarding  labor warning s/s.  Reviewed warning signs, normal FM, and how/when to call.  Confirmed after-hours number given to patient.    Follow-up: 4 weeks, call or present sooner FIDEL Ruiz

## 2023-11-07 NOTE — PROGRESS NOTES
Reason for Visit   No chief complaint on file.    28 y.o.,  at 19w2d    Complaints today: ***.  Doing well without concerns.    Denies contractions, vaginal bleeding, leaking amniotic fluid.  Reports *** fetal movement    TWG: *** lbs   Allergies: Pistachio nut, Apple, and Nuts [tree nut]    PHYSICAL EXAM  GENERAL: No acute distress  HEENT: Normocephalic, atruamatic  NEURO: Alert and oriented x3  PSYCH: Calm, normal mood and affect  PULMONARY: Non-labored respiration; no tachypnea  ABD: Soft, gravid, nontender  FH = umbilicus    ASSESSMENT AND PLAN  There are no diagnoses linked to this encounter.    Anatomy ultrasound - done before today's visit - no results yet  Reviewed exercise/diet recommendations in pregnancy.  Encouraged prenatal education classes - schedule given.  Education regarding  labor*** warning s/s.  Reviewed warning signs, normal FM, and how/when to call.  ***Confirmed after-hours number given to patient.    Follow-up: 4 weeks, call or present sooner MARIA VICTORIA Sol CNM, APRN

## 2023-11-08 PROBLEM — Z34.90 PREGNANCY: Status: ACTIVE | Noted: 2023-11-08

## 2023-11-08 PROBLEM — O36.80X0 PREGNANCY WITH UNCERTAIN FETAL VIABILITY: Status: RESOLVED | Noted: 2023-08-24 | Resolved: 2023-11-08

## 2023-12-06 ENCOUNTER — ROUTINE PRENATAL (OUTPATIENT)
Dept: OBSTETRICS AND GYNECOLOGY | Facility: CLINIC | Age: 28
End: 2023-12-06
Payer: COMMERCIAL

## 2023-12-06 VITALS
WEIGHT: 143.63 LBS | BODY MASS INDEX: 23.18 KG/M2 | DIASTOLIC BLOOD PRESSURE: 52 MMHG | SYSTOLIC BLOOD PRESSURE: 109 MMHG

## 2023-12-06 DIAGNOSIS — Z34.90 PREGNANCY, UNSPECIFIED GESTATIONAL AGE: ICD-10-CM

## 2023-12-06 DIAGNOSIS — O09.899 RUBELLA NON-IMMUNE STATUS, ANTEPARTUM: Primary | ICD-10-CM

## 2023-12-06 DIAGNOSIS — Z28.39 RUBELLA NON-IMMUNE STATUS, ANTEPARTUM: Primary | ICD-10-CM

## 2023-12-06 PROCEDURE — 0502F SUBSEQUENT PRENATAL CARE: CPT | Mod: CPTII,S$GLB,,

## 2023-12-06 PROCEDURE — 99999 PR PBB SHADOW E&M-EST. PATIENT-LVL II: ICD-10-PCS | Mod: PBBFAC,,,

## 2023-12-06 PROCEDURE — 0502F PR SUBSEQUENT PRENATAL CARE: ICD-10-PCS | Mod: CPTII,S$GLB,,

## 2023-12-06 PROCEDURE — 99999 PR PBB SHADOW E&M-EST. PATIENT-LVL II: CPT | Mod: PBBFAC,,,

## 2023-12-06 NOTE — PROGRESS NOTES
No chief complaint on file.    28 y.o. female  at 23w3d, by Estimated Date of Delivery: 3/31/24    Complaints today: None. Doing well today.   Denies UCs, LOF, or VB. Reports daily FM.    Reviewed TW lbs    PHYSICAL EXAM  LMP 2023 (Exact Date)     GENERAL: No acute distress  HEENT: Normocephalic, atraumatic  NEURO: Alert and oriented x3  PSYCH: Normal mood and affect  PULMONARY: Non-labored respiration; no tachypnea  ABD: Soft, gravid, nontender      ASSESSMENT AND PLAN  Rubella non-immune status, antepartum    Pregnancy, unspecified gestational age        Reviewed upcoming 28wk labs, (A POS) and orders placed  Education regarding warning signs of PreEclampsia, reviewed normal FM,  labor precautions, and how/when to call.    Follow-up: 4 weeks, call or present sooner MARIA VICTORIA Sol CNM, APRN

## 2023-12-10 ENCOUNTER — PATIENT MESSAGE (OUTPATIENT)
Dept: OTHER | Facility: OTHER | Age: 28
End: 2023-12-10
Payer: COMMERCIAL

## 2023-12-24 ENCOUNTER — PATIENT MESSAGE (OUTPATIENT)
Dept: OTHER | Facility: OTHER | Age: 28
End: 2023-12-24
Payer: COMMERCIAL

## 2024-01-07 ENCOUNTER — PATIENT MESSAGE (OUTPATIENT)
Dept: OTHER | Facility: OTHER | Age: 29
End: 2024-01-07
Payer: COMMERCIAL

## 2024-01-12 ENCOUNTER — ROUTINE PRENATAL (OUTPATIENT)
Dept: OBSTETRICS AND GYNECOLOGY | Facility: CLINIC | Age: 29
End: 2024-01-12
Payer: COMMERCIAL

## 2024-01-12 ENCOUNTER — LAB VISIT (OUTPATIENT)
Dept: LAB | Facility: OTHER | Age: 29
End: 2024-01-12
Payer: COMMERCIAL

## 2024-01-12 VITALS
WEIGHT: 151.38 LBS | DIASTOLIC BLOOD PRESSURE: 58 MMHG | HEART RATE: 88 BPM | BODY MASS INDEX: 24.43 KG/M2 | SYSTOLIC BLOOD PRESSURE: 122 MMHG

## 2024-01-12 DIAGNOSIS — Z3A.28 28 WEEKS GESTATION OF PREGNANCY: ICD-10-CM

## 2024-01-12 DIAGNOSIS — Z34.90 PREGNANCY, UNSPECIFIED GESTATIONAL AGE: ICD-10-CM

## 2024-01-12 DIAGNOSIS — O09.899 RUBELLA NON-IMMUNE STATUS, ANTEPARTUM: Primary | ICD-10-CM

## 2024-01-12 DIAGNOSIS — Z23: ICD-10-CM

## 2024-01-12 DIAGNOSIS — Z28.39 RUBELLA NON-IMMUNE STATUS, ANTEPARTUM: Primary | ICD-10-CM

## 2024-01-12 LAB
BASOPHILS # BLD AUTO: 0.02 K/UL (ref 0–0.2)
BASOPHILS NFR BLD: 0.2 % (ref 0–1.9)
DIFFERENTIAL METHOD BLD: ABNORMAL
EOSINOPHIL # BLD AUTO: 0 K/UL (ref 0–0.5)
EOSINOPHIL NFR BLD: 0.3 % (ref 0–8)
ERYTHROCYTE [DISTWIDTH] IN BLOOD BY AUTOMATED COUNT: 13.3 % (ref 11.5–14.5)
GLUCOSE SERPL-MCNC: 85 MG/DL (ref 70–140)
HCT VFR BLD AUTO: 35.9 % (ref 37–48.5)
HGB BLD-MCNC: 11.8 G/DL (ref 12–16)
IMM GRANULOCYTES # BLD AUTO: 0.06 K/UL (ref 0–0.04)
IMM GRANULOCYTES NFR BLD AUTO: 0.7 % (ref 0–0.5)
LYMPHOCYTES # BLD AUTO: 1.7 K/UL (ref 1–4.8)
LYMPHOCYTES NFR BLD: 18.4 % (ref 18–48)
MCH RBC QN AUTO: 31.2 PG (ref 27–31)
MCHC RBC AUTO-ENTMCNC: 32.9 G/DL (ref 32–36)
MCV RBC AUTO: 95 FL (ref 82–98)
MONOCYTES # BLD AUTO: 0.5 K/UL (ref 0.3–1)
MONOCYTES NFR BLD: 5 % (ref 4–15)
NEUTROPHILS # BLD AUTO: 6.8 K/UL (ref 1.8–7.7)
NEUTROPHILS NFR BLD: 75.4 % (ref 38–73)
NRBC BLD-RTO: 0 /100 WBC
PLATELET # BLD AUTO: 221 K/UL (ref 150–450)
PMV BLD AUTO: 10 FL (ref 9.2–12.9)
RBC # BLD AUTO: 3.78 M/UL (ref 4–5.4)
WBC # BLD AUTO: 9.07 K/UL (ref 3.9–12.7)

## 2024-01-12 PROCEDURE — 99999 PR PBB SHADOW E&M-EST. PATIENT-LVL II: CPT | Mod: PBBFAC,,, | Performed by: ADVANCED PRACTICE MIDWIFE

## 2024-01-12 PROCEDURE — 85025 COMPLETE CBC W/AUTO DIFF WBC: CPT

## 2024-01-12 PROCEDURE — 82950 GLUCOSE TEST: CPT

## 2024-01-12 PROCEDURE — 36415 COLL VENOUS BLD VENIPUNCTURE: CPT

## 2024-01-12 PROCEDURE — 0502F SUBSEQUENT PRENATAL CARE: CPT | Mod: CPTII,S$GLB,, | Performed by: ADVANCED PRACTICE MIDWIFE

## 2024-01-12 NOTE — PROGRESS NOTES
28 y.o. female  at 28w5d by Estimated Date of Delivery: 3/31/24    Complaints today: none. GTT in progress.  Denies UCs, LOF, VB. Reports good fetal movement.    Reviewed TWlbs    PHYSICAL EXAM  BP (!) 122/58   Pulse 88   Wt 68.6 kg (151 lb 5.5 oz)   LMP 2023 (Exact Date)   BMI 24.43 kg/m²     GENERAL: No acute distress  HEENT: Normocephalic, atraumatic  NEURO: Alert and oriented x3  PULMONARY: Non-labored respiration; no tachypnea  ABD: Soft, gravid, nontender.    ASSESSMENT AND PLAN  Rubella non-immune status, antepartum  -     (In Office Administered) Tdap Vaccine    Need for immunization against pertussis alone  -     (In Office Administered) Tdap Vaccine    28 weeks gestation of pregnancy        Completing 28wk labs today.   Blood type: (A POS). Rhogam not indicated.  Education regarding CDC recommendations for Tdap in pregnancy. Patient accepts. Order placed, will schedule with next appt.    Reviewed warning signs, normal FKCs,  labor precautions and how/when to call.    Follow-up: 2-3 weeks, call or present sooner PRN.    Brissa Johnston, DARRON, APRN

## 2024-01-21 ENCOUNTER — PATIENT MESSAGE (OUTPATIENT)
Dept: OTHER | Facility: OTHER | Age: 29
End: 2024-01-21
Payer: COMMERCIAL

## 2024-01-29 ENCOUNTER — ROUTINE PRENATAL (OUTPATIENT)
Dept: OBSTETRICS AND GYNECOLOGY | Facility: CLINIC | Age: 29
End: 2024-01-29
Payer: COMMERCIAL

## 2024-01-29 VITALS
HEART RATE: 83 BPM | BODY MASS INDEX: 24.64 KG/M2 | WEIGHT: 152.69 LBS | DIASTOLIC BLOOD PRESSURE: 58 MMHG | SYSTOLIC BLOOD PRESSURE: 115 MMHG

## 2024-01-29 DIAGNOSIS — Z3A.31 31 WEEKS GESTATION OF PREGNANCY: Primary | ICD-10-CM

## 2024-01-29 PROCEDURE — 99999 PR PBB SHADOW E&M-EST. PATIENT-LVL III: CPT | Mod: PBBFAC,,,

## 2024-01-29 PROCEDURE — 0502F SUBSEQUENT PRENATAL CARE: CPT | Mod: S$GLB,,,

## 2024-01-29 NOTE — PROGRESS NOTES
28 y.o.,  at 31w1d    Presents today for routine OB appt. Complaints today: none.  Doing well without concerns.    TW lbs   Denies UCs, LOF, VB. Denies HA, visual disturbances, or upper abdominal pain; no nausea/vomiting. Reports positive fetal movement.    PHYSICAL EXAM  GENERAL: No acute distress  HEENT: Normocephalic, atraumatic  NEURO: Alert and oriented x3  PULMONARY: Non-labored respiration; no tachypnea  ABD: Soft, gravid, nontender.      ASSESSMENT AND PLAN  31 weeks gestation of pregnancy      Reviewed 28wk labs  Receiving TDAP today   Is not taking Childbirth Education classes.  Education regarding options for postpartum contraception,  will have vasectomy  Reviewed warning s/s for PreE, PTL, FMC, and discussed how/when to call.    Birth Center Risk Assessment: 0- Meets birth center guidelines    CNM management in [facility]  CNM management on L&D  Consultation with OB to develop  plan of care  Collaborative CNM/OB management with delivery on L&D  Permanent referral of care to MD      Follow-up: 2 weeks, call or present sooner MARIA VICTORIA Arana, DARRON, APRN

## 2024-02-02 ENCOUNTER — PATIENT MESSAGE (OUTPATIENT)
Dept: ADMINISTRATIVE | Facility: OTHER | Age: 29
End: 2024-02-02
Payer: COMMERCIAL

## 2024-02-04 ENCOUNTER — PATIENT MESSAGE (OUTPATIENT)
Dept: OTHER | Facility: OTHER | Age: 29
End: 2024-02-04
Payer: COMMERCIAL

## 2024-02-19 ENCOUNTER — CLINICAL SUPPORT (OUTPATIENT)
Dept: OBSTETRICS AND GYNECOLOGY | Facility: CLINIC | Age: 29
End: 2024-02-19
Payer: COMMERCIAL

## 2024-02-19 ENCOUNTER — ROUTINE PRENATAL (OUTPATIENT)
Dept: OBSTETRICS AND GYNECOLOGY | Facility: CLINIC | Age: 29
End: 2024-02-19
Payer: COMMERCIAL

## 2024-02-19 VITALS
SYSTOLIC BLOOD PRESSURE: 113 MMHG | WEIGHT: 156.19 LBS | BODY MASS INDEX: 25.21 KG/M2 | DIASTOLIC BLOOD PRESSURE: 58 MMHG | HEART RATE: 75 BPM

## 2024-02-19 DIAGNOSIS — O09.293 HISTORY OF POSTPARTUM HEMORRHAGE, CURRENTLY PREGNANT IN THIRD TRIMESTER: ICD-10-CM

## 2024-02-19 DIAGNOSIS — Z34.83 MULTIGRAVIDA IN THIRD TRIMESTER: Primary | ICD-10-CM

## 2024-02-19 DIAGNOSIS — Z23 NEED FOR TDAP VACCINATION: Primary | ICD-10-CM

## 2024-02-19 DIAGNOSIS — Z3A.34 34 WEEKS GESTATION OF PREGNANCY: ICD-10-CM

## 2024-02-19 PROCEDURE — 90471 IMMUNIZATION ADMIN: CPT | Mod: PBBFAC

## 2024-02-19 PROCEDURE — 99999 PR PBB SHADOW E&M-EST. PATIENT-LVL I: CPT | Mod: PBBFAC,,,

## 2024-02-19 PROCEDURE — 99999 PR PBB SHADOW E&M-EST. PATIENT-LVL II: CPT | Mod: PBBFAC,,,

## 2024-02-19 PROCEDURE — 0502F SUBSEQUENT PRENATAL CARE: CPT | Mod: S$GLB,,,

## 2024-02-19 NOTE — PROGRESS NOTES
28 y.o.,  at 34w1d, presents for routine OB appt.  Denies LOF, VB, or UCs. Reports good FM.  Doing well today, without concerns.   TW lbs     PHYSICAL EXAM  GENERAL: No acute distress  HEENT: Normocephalic, atraumatic  NEURO: Alert and oriented x3  PULMONARY: Non-labored respiration; no tachypnea  ABD: Soft, gravid, nontender.    ASSESSMENT AND PLAN  Multigravida in third trimester    History of postpartum hemorrhage, currently pregnant in third trimester      Discussed history of PPH  Reviewed upcoming 36wk labs ordered.   Reviewed patient does NOT have a history of genital HSV.     Reviewed ABC risk assessment with the patient:  Birth Center Risk Assessment: 1-management on labor and Delivery due to history of PPH. Placed on patient review.  CNM management in [facility]  CNM management on L&D  Consultation with OB to develop  plan of care  Collaborative CNM/OB management with delivery on L&D  Permanent referral of care to MD  Patient understands is currently NOT a candidate for the [facility]. Reviewed potential risks which could arise, that could change this status.    Reviewed warning signs, normal FM,  labor precautions, and how/when to call. Confirmed pt has after-hours number.    Follow-up: 2 weeks, call or present sooner MARIA VICTORIA Lang CNM, APRN

## 2024-02-22 ENCOUNTER — DOCUMENTATION ONLY (OUTPATIENT)
Dept: OBSTETRICS AND GYNECOLOGY | Facility: HOSPITAL | Age: 29
End: 2024-02-22
Payer: COMMERCIAL

## 2024-02-22 NOTE — PROGRESS NOTES
CNMs reviewed patient medical and obstetrical history with Dr. Campbell.     Patient's history is significant for history of postpartum hemorrhage (650 EBL) and methergine, with resolution after methergine.       Dr. Campbell recommendations for patient:  ABC is OK with saline lock in place.

## 2024-02-25 ENCOUNTER — PATIENT MESSAGE (OUTPATIENT)
Dept: OTHER | Facility: OTHER | Age: 29
End: 2024-02-25
Payer: COMMERCIAL

## 2024-03-06 ENCOUNTER — ROUTINE PRENATAL (OUTPATIENT)
Dept: OBSTETRICS AND GYNECOLOGY | Facility: CLINIC | Age: 29
End: 2024-03-06
Payer: COMMERCIAL

## 2024-03-06 ENCOUNTER — LAB VISIT (OUTPATIENT)
Dept: LAB | Facility: OTHER | Age: 29
End: 2024-03-06
Payer: COMMERCIAL

## 2024-03-06 VITALS
SYSTOLIC BLOOD PRESSURE: 116 MMHG | HEART RATE: 78 BPM | BODY MASS INDEX: 25.71 KG/M2 | DIASTOLIC BLOOD PRESSURE: 60 MMHG | WEIGHT: 159.31 LBS

## 2024-03-06 DIAGNOSIS — Z3A.34 34 WEEKS GESTATION OF PREGNANCY: ICD-10-CM

## 2024-03-06 DIAGNOSIS — Z3A.36 36 WEEKS GESTATION OF PREGNANCY: Primary | ICD-10-CM

## 2024-03-06 LAB
BASOPHILS # BLD AUTO: 0.02 K/UL (ref 0–0.2)
BASOPHILS NFR BLD: 0.2 % (ref 0–1.9)
DIFFERENTIAL METHOD BLD: ABNORMAL
EOSINOPHIL # BLD AUTO: 0 K/UL (ref 0–0.5)
EOSINOPHIL NFR BLD: 0.3 % (ref 0–8)
ERYTHROCYTE [DISTWIDTH] IN BLOOD BY AUTOMATED COUNT: 13.2 % (ref 11.5–14.5)
HCT VFR BLD AUTO: 37.1 % (ref 37–48.5)
HGB BLD-MCNC: 11.9 G/DL (ref 12–16)
HIV 1+2 AB+HIV1 P24 AG SERPL QL IA: NORMAL
IMM GRANULOCYTES # BLD AUTO: 0.14 K/UL (ref 0–0.04)
IMM GRANULOCYTES NFR BLD AUTO: 1.3 % (ref 0–0.5)
LYMPHOCYTES # BLD AUTO: 1.6 K/UL (ref 1–4.8)
LYMPHOCYTES NFR BLD: 15.5 % (ref 18–48)
MCH RBC QN AUTO: 30.5 PG (ref 27–31)
MCHC RBC AUTO-ENTMCNC: 32.1 G/DL (ref 32–36)
MCV RBC AUTO: 95 FL (ref 82–98)
MONOCYTES # BLD AUTO: 0.6 K/UL (ref 0.3–1)
MONOCYTES NFR BLD: 5.7 % (ref 4–15)
NEUTROPHILS # BLD AUTO: 8.1 K/UL (ref 1.8–7.7)
NEUTROPHILS NFR BLD: 77 % (ref 38–73)
NRBC BLD-RTO: 0 /100 WBC
PLATELET # BLD AUTO: 209 K/UL (ref 150–450)
PMV BLD AUTO: 10.4 FL (ref 9.2–12.9)
RBC # BLD AUTO: 3.9 M/UL (ref 4–5.4)
WBC # BLD AUTO: 10.47 K/UL (ref 3.9–12.7)

## 2024-03-06 PROCEDURE — 86592 SYPHILIS TEST NON-TREP QUAL: CPT

## 2024-03-06 PROCEDURE — 87081 CULTURE SCREEN ONLY: CPT | Performed by: ADVANCED PRACTICE MIDWIFE

## 2024-03-06 PROCEDURE — 0502F SUBSEQUENT PRENATAL CARE: CPT | Mod: S$GLB,,, | Performed by: ADVANCED PRACTICE MIDWIFE

## 2024-03-06 PROCEDURE — 85025 COMPLETE CBC W/AUTO DIFF WBC: CPT

## 2024-03-06 PROCEDURE — 99999 PR PBB SHADOW E&M-EST. PATIENT-LVL II: CPT | Mod: PBBFAC,,, | Performed by: ADVANCED PRACTICE MIDWIFE

## 2024-03-06 PROCEDURE — 87389 HIV-1 AG W/HIV-1&-2 AB AG IA: CPT

## 2024-03-06 PROCEDURE — 36415 COLL VENOUS BLD VENIPUNCTURE: CPT

## 2024-03-06 NOTE — PROGRESS NOTES
28 y.o.,  at 36w3d here today for routine ob appt.    Complaints today: none.  Doing well without concerns. Denies LOF, VB, and cramping or regular contractions. Denies dysuria and any other s/s UTI. Denies HA, visual disturbances, and upper abdominal pain. Reports good fetal movement.    Verified neg history of genital HSV.  TW lbs     PHYSICAL EXAM  GENERAL: No acute distress  HEENT: Normocephalic, atraumatic  NEURO: Alert and oriented x3  PULMONARY: Non-labored respiration; no tachypnea  ABD: Soft, gravid, nontender. vtx per Leopold's.  FH 35 cm, + fhts 140s, declines SVE. GBS cx done.    ASSESSMENT AND PLAN  36 weeks gestation of pregnancy  -     Strep B Screen, Vaginal / Rectal      GBS collected today. Reviewed Allergies: Pistachio nut, Apple, and Nuts [tree nut]  Reviewed [place] department of Health recommendation for repeat HIV/RPR today; patient. Third trimester labs today.    Birth Center Risk Assessment: 0- Meets birth center guidelines    CNM management in [facility]  CNM management on L&D  Consultation with OB to develop  plan of care  Collaborative CNM/OB management with delivery on L&D  Permanent referral of care to MD     Reviewed warning signs, normal FM,  labor precautions, and how/when to call.      As per Dr. Campbell, may have ABC labor/delivery with IV access. Patient understands and is pleased with plan.    Follow-up: 1 week, call or present sooner MARIA VICTORIA Estrada CNM/ERIC

## 2024-03-07 ENCOUNTER — TELEPHONE (OUTPATIENT)
Dept: OBSTETRICS AND GYNECOLOGY | Facility: CLINIC | Age: 29
End: 2024-03-07
Payer: COMMERCIAL

## 2024-03-07 LAB — RPR SER QL: NORMAL

## 2024-03-07 NOTE — TELEPHONE ENCOUNTER
Attempted to call pt. No answer. LVM    ===================================  Marielle Banks MA Mackey, Megan C., JOSE  Caller: Unspecified (Today,  1:50 PM)  Patient called stating she missed midwife call and would like a call back.    Marielle Noble MA

## 2024-03-07 NOTE — TELEPHONE ENCOUNTER
"Spoke with pt - reports brief, transient episode of darkness to peripheral vision with "sparkly lights", which quickly resolved spontaneously this morning following eating something and laying down to rest. Reports feeling tired at this time, hours later, but no additional signs and symptoms. Denies headache, denies upper abdominal pain or nausea/vomiting. Denies any further visual disturbances.  Reports normal fetal movement and denies UCs, no LOF or VB.     Pt has connected mom and will take B/P at home now. Advised call or come in if recurrent visual disturbances, or elevated blood pressure reading, and reviewed warning s/s PreEclampsia.  "

## 2024-03-07 NOTE — TELEPHONE ENCOUNTER
Attempted to call pt - no answer. LVM    ----- Message from Marielle Banks MA sent at 3/7/2024  9:52 AM CST -----  Please see below.    Marielle Noble MA  ----- Message -----  From: Toya Faith  Sent: 3/7/2024   9:44 AM CST  To: [facility] Staff    Name of Who is calling :  ARIADNA PERSON [[id number]]      What is the request in detail:Pts vision is blurry and she is seeing sparkly light. Vision is not clear.Please assist          Can the clinic reply by [facility]:no             What number to call back if not in [facility]: [phone]

## 2024-03-09 LAB — BACTERIA SPEC AEROBE CULT: NORMAL

## 2024-03-13 ENCOUNTER — ROUTINE PRENATAL (OUTPATIENT)
Dept: OBSTETRICS AND GYNECOLOGY | Facility: CLINIC | Age: 29
End: 2024-03-13
Payer: COMMERCIAL

## 2024-03-13 VITALS
BODY MASS INDEX: 25.79 KG/M2 | HEART RATE: 80 BPM | DIASTOLIC BLOOD PRESSURE: 65 MMHG | SYSTOLIC BLOOD PRESSURE: 121 MMHG | WEIGHT: 159.81 LBS

## 2024-03-13 DIAGNOSIS — Z3A.37 37 WEEKS GESTATION OF PREGNANCY: Primary | ICD-10-CM

## 2024-03-13 DIAGNOSIS — O09.899 RUBELLA NON-IMMUNE STATUS, ANTEPARTUM: ICD-10-CM

## 2024-03-13 DIAGNOSIS — O09.293 HISTORY OF POSTPARTUM HEMORRHAGE, CURRENTLY PREGNANT IN THIRD TRIMESTER: ICD-10-CM

## 2024-03-13 DIAGNOSIS — Z28.39 RUBELLA NON-IMMUNE STATUS, ANTEPARTUM: ICD-10-CM

## 2024-03-13 PROCEDURE — 99999 PR PBB SHADOW E&M-EST. PATIENT-LVL III: CPT | Mod: PBBFAC,,, | Performed by: ADVANCED PRACTICE MIDWIFE

## 2024-03-13 PROCEDURE — 0502F SUBSEQUENT PRENATAL CARE: CPT | Mod: S$GLB,,, | Performed by: ADVANCED PRACTICE MIDWIFE

## 2024-03-13 NOTE — PROGRESS NOTES
28 y.o.,  at 37w3d presents today for routine OB appt. Unaccompanied today    Denies regular UCs, LOF or VB. Reports good FM. Doing well without concerns - feeling ready for baby to come.  TW lbs     PHYSICAL EXAM  GENERAL: No acute distress  HEENT: Normocephalic, atraumatic  NEURO: Alert and oriented x3  PULMONARY: Non-labored respiration; no tachypnea  ABD: Soft, gravid, nontender.    ASSESSMENT AND PLAN  37 weeks gestation of pregnancy    Rubella non-immune status, antepartum    History of postpartum hemorrhage, currently pregnant in third trimester    Delivery consents have been signed  Reviewed GBS negative  Reviewed repeat 3rd trimester HIV/RPR negative  Education regarding labor precautions.  Reviewed warning signs, normal FM, and how/when to call. After-hours number given     Birth Center Risk Assessment: 0- Meets birth center guidelines (with IV access)  CNM management in [facility]  CNM management on L&D  Consultation with OB to develop  plan of care  Collaborative CNM/OB management with delivery on L&D  Permanent referral of care to MD      Follow-up: 1 week, call or present sooner MARIA VICTORIA Smith CNM/ERIC

## 2024-03-20 ENCOUNTER — ROUTINE PRENATAL (OUTPATIENT)
Dept: OBSTETRICS AND GYNECOLOGY | Facility: CLINIC | Age: 29
End: 2024-03-20
Payer: COMMERCIAL

## 2024-03-20 VITALS
BODY MASS INDEX: 25.92 KG/M2 | WEIGHT: 160.63 LBS | DIASTOLIC BLOOD PRESSURE: 64 MMHG | SYSTOLIC BLOOD PRESSURE: 123 MMHG | HEART RATE: 80 BPM

## 2024-03-20 DIAGNOSIS — Z34.83 MULTIGRAVIDA IN THIRD TRIMESTER: Primary | ICD-10-CM

## 2024-03-20 DIAGNOSIS — Z3A.38 38 WEEKS GESTATION OF PREGNANCY: ICD-10-CM

## 2024-03-20 DIAGNOSIS — Z28.39 RUBELLA NON-IMMUNE STATUS, ANTEPARTUM: ICD-10-CM

## 2024-03-20 DIAGNOSIS — O09.899 RUBELLA NON-IMMUNE STATUS, ANTEPARTUM: ICD-10-CM

## 2024-03-20 DIAGNOSIS — O09.293 HISTORY OF POSTPARTUM HEMORRHAGE, CURRENTLY PREGNANT IN THIRD TRIMESTER: ICD-10-CM

## 2024-03-20 PROCEDURE — 0502F SUBSEQUENT PRENATAL CARE: CPT | Mod: S$GLB,,, | Performed by: ADVANCED PRACTICE MIDWIFE

## 2024-03-20 PROCEDURE — 99999 PR PBB SHADOW E&M-EST. PATIENT-LVL II: CPT | Mod: PBBFAC,,, | Performed by: ADVANCED PRACTICE MIDWIFE

## 2024-03-20 NOTE — PROGRESS NOTES
28 y.o.,  at 38w3d presents today for routine OB appt. Doing great!  Denies regular UCs, LOF or VB. Reports good FM. Doing well without concerns.  TW lbs     PHYSICAL EXAM  GENERAL: No acute distress  HEENT: Normocephalic, atraumatic  NEURO: Alert and oriented x3  PULMONARY: Non-labored respiration; no tachypnea  ABD: Soft, gravid, nontender.    ASSESSMENT AND PLAN  Multigravida in third trimester    Rubella non-immune status, antepartum    History of postpartum hemorrhage, currently pregnant in third trimester    38 weeks gestation of pregnancy      Birth Center Risk Assessment: 0- Meets birth center guidelines    CNM management in [facility]  CNM management on L&D  Consultation with OB to develop  plan of care  Collaborative CNM/OB management with delivery on L&D  Permanent referral of care to MD     Delivery consents signed 3/6/24  Reviewed GBS neg and no need for intrapartum abx  Reviewed repeat 3rd trimester HIV/RPR are both negative.  Education regarding labor precautions.  As per Dr. Campbell, may have ABC labor/delivery with IV access. Patient understands and is pleased with plan   Reviewed warning signs, normal FM, and how/when to call.      Follow-up: 1 week, call or present sooner MARIA VICTORIA Estrada CNM/ERIC

## 2024-03-27 ENCOUNTER — ROUTINE PRENATAL (OUTPATIENT)
Dept: OBSTETRICS AND GYNECOLOGY | Facility: CLINIC | Age: 29
End: 2024-03-27
Payer: COMMERCIAL

## 2024-03-27 VITALS
WEIGHT: 161.5 LBS | DIASTOLIC BLOOD PRESSURE: 59 MMHG | HEART RATE: 85 BPM | BODY MASS INDEX: 26.06 KG/M2 | SYSTOLIC BLOOD PRESSURE: 119 MMHG

## 2024-03-27 DIAGNOSIS — Z3A.39 39 WEEKS GESTATION OF PREGNANCY: Primary | ICD-10-CM

## 2024-03-27 DIAGNOSIS — O09.899 RUBELLA NON-IMMUNE STATUS, ANTEPARTUM: ICD-10-CM

## 2024-03-27 DIAGNOSIS — Z34.83 MULTIGRAVIDA IN THIRD TRIMESTER: ICD-10-CM

## 2024-03-27 DIAGNOSIS — Z28.39 RUBELLA NON-IMMUNE STATUS, ANTEPARTUM: ICD-10-CM

## 2024-03-27 DIAGNOSIS — O09.293 HISTORY OF POSTPARTUM HEMORRHAGE, CURRENTLY PREGNANT IN THIRD TRIMESTER: ICD-10-CM

## 2024-03-27 PROCEDURE — 0502F SUBSEQUENT PRENATAL CARE: CPT | Mod: S$GLB,,,

## 2024-03-27 PROCEDURE — 99999 PR PBB SHADOW E&M-EST. PATIENT-LVL III: CPT | Mod: PBBFAC,,,

## 2024-03-27 NOTE — PROGRESS NOTES
28 y.o.,  at 39w3d presents today for routine OB appt.  Denies HA, visual disturbances, or upper abdominal pain/GI complaints. No regular UCs, denies LOF or VB. Reports good FM  Doing well without concerns. Some eleni lafleur contractions.     PHYSICAL EXAM  GENERAL: No acute distress  HEENT: Normocephalic, atraumatic  NEURO: Alert and oriented x3  PULMONARY: Non-labored respiration; no tachypnea  ABD: Soft, gravid, nontender.    ASSESSMENT AND PLAN  39 weeks gestation of pregnancy    Multigravida in third trimester    Rubella non-immune status, antepartum    History of postpartum hemorrhage, currently pregnant in third trimester        Delivery consents already signed - 3/6  Discussed plans for support people during labor - patient plans to have , Manuel.  Reviewed warning signs, normal FM, labor precautions, and how/when to call.      Follow-up: 1 week, call or present sooner MARIA VICTORIA Arana, DARRON, APRN

## 2024-04-01 ENCOUNTER — ROUTINE PRENATAL (OUTPATIENT)
Dept: OBSTETRICS AND GYNECOLOGY | Facility: CLINIC | Age: 29
End: 2024-04-01
Payer: COMMERCIAL

## 2024-04-01 VITALS
HEART RATE: 91 BPM | DIASTOLIC BLOOD PRESSURE: 68 MMHG | BODY MASS INDEX: 26.03 KG/M2 | WEIGHT: 161.25 LBS | SYSTOLIC BLOOD PRESSURE: 123 MMHG

## 2024-04-01 DIAGNOSIS — Z28.39 RUBELLA NON-IMMUNE STATUS, ANTEPARTUM: ICD-10-CM

## 2024-04-01 DIAGNOSIS — O48.0 POST-TERM PREGNANCY, 40-42 WEEKS OF GESTATION: Primary | ICD-10-CM

## 2024-04-01 DIAGNOSIS — R82.90 ABNORMAL FINDING ON URINALYSIS: ICD-10-CM

## 2024-04-01 DIAGNOSIS — O09.899 RUBELLA NON-IMMUNE STATUS, ANTEPARTUM: ICD-10-CM

## 2024-04-01 PROCEDURE — 0502F SUBSEQUENT PRENATAL CARE: CPT | Mod: S$GLB,,, | Performed by: ADVANCED PRACTICE MIDWIFE

## 2024-04-01 PROCEDURE — 87086 URINE CULTURE/COLONY COUNT: CPT | Performed by: ADVANCED PRACTICE MIDWIFE

## 2024-04-01 PROCEDURE — 99999 PR PBB SHADOW E&M-EST. PATIENT-LVL II: CPT | Mod: PBBFAC,,, | Performed by: ADVANCED PRACTICE MIDWIFE

## 2024-04-01 NOTE — PROGRESS NOTES
28 y.o. female  at 40w1d, by Estimated Date of Delivery: 3/31/24 here for routine OB appt.  Denies LOF, VB, or regular UCs. Reports good FM. Denies s/s Preeclampsia  Doing well without concerns    PHYSICAL EXAM  /68   Pulse 91   Wt 73.1 kg (161 lb 4.3 oz)   LMP 2023 (Exact Date)   BMI 26.03 kg/m²   GENERAL: No acute distress  HEENT: Normocephalic, atraumatic  NEURO: Alert and oriented x3  PULMONARY: Non-labored respiration; no tachypnea  ABD: Soft, gravid, nontender.    ASSESSMENT AND PLAN  Post-term pregnancy, 40-42 weeks of gestation  -     Prenatal Testing - NST/RYLEE; Standing  -     IP OB Labor Induction; Future  -     CULTURE, URINE    Abnormal finding on urinalysis  -     CULTURE, URINE      +Nitrite on UA - for urine culture  Delivery consents  signed.  Discussed postdates management and IOL - patient prefers to await spontaneous labor if possible / desires IOL at 41w 6d.  Discussed postdates prenatal testing and that IOL would be recommended immediately if prenatal testing is not reassuring; patient states understanding and agrees to this.  Scheduled NST/RYLEE at 41w1d on Monday and again at 41w4d on Thursday.   Patient desires IOL at 41w6d on Saturday. Order placed. Epic staff message sent to CNMs re: scheduling of IOL    Reviewed warning signs/labor precautions, normal FM, and how/when to call.      Follow-up: 1 week, call or present sooner MARIA VICTORIA Johnston CNM/ERIC

## 2024-04-02 LAB — BACTERIA UR CULT: NO GROWTH

## 2024-04-04 ENCOUNTER — TELEPHONE (OUTPATIENT)
Dept: OBSTETRICS AND GYNECOLOGY | Facility: CLINIC | Age: 29
End: 2024-04-04
Payer: COMMERCIAL

## 2024-04-04 ENCOUNTER — TELEPHONE (OUTPATIENT)
Dept: OBSTETRICS AND GYNECOLOGY | Facility: HOSPITAL | Age: 29
End: 2024-04-04
Payer: COMMERCIAL

## 2024-04-04 NOTE — TELEPHONE ENCOUNTER
Called patient to discuss date/time of IOL. Desires membrane sweep at [place]. Strict labor precautions.

## 2024-04-07 ENCOUNTER — PATIENT MESSAGE (OUTPATIENT)
Dept: OBSTETRICS AND GYNECOLOGY | Facility: HOSPITAL | Age: 29
End: 2024-04-07
Payer: COMMERCIAL

## 2024-04-08 ENCOUNTER — HOSPITAL ENCOUNTER (INPATIENT)
Facility: OTHER | Age: 29
LOS: 1 days | Discharge: HOME | End: 2024-04-09
Attending: STUDENT IN AN ORGANIZED HEALTH CARE EDUCATION/TRAINING PROGRAM | Admitting: OBSTETRICS & GYNECOLOGY
Payer: COMMERCIAL

## 2024-04-08 ENCOUNTER — TELEPHONE (OUTPATIENT)
Dept: OBSTETRICS AND GYNECOLOGY | Facility: OTHER | Age: 29
End: 2024-04-08
Payer: COMMERCIAL

## 2024-04-08 ENCOUNTER — TELEPHONE (OUTPATIENT)
Dept: OBSTETRICS AND GYNECOLOGY | Facility: CLINIC | Age: 29
End: 2024-04-08
Payer: COMMERCIAL

## 2024-04-08 ENCOUNTER — PATIENT MESSAGE (OUTPATIENT)
Dept: OBSTETRICS AND GYNECOLOGY | Facility: CLINIC | Age: 29
End: 2024-04-08

## 2024-04-08 DIAGNOSIS — Z37.9 NORMAL LABOR: Primary | ICD-10-CM

## 2024-04-08 DIAGNOSIS — O47.9 UTERINE CONTRACTIONS DURING PREGNANCY: ICD-10-CM

## 2024-04-08 DIAGNOSIS — Z3A.41 41 WEEKS GESTATION OF PREGNANCY: ICD-10-CM

## 2024-04-08 DIAGNOSIS — O48.0 41 WEEKS GESTATION OF PREGNANCY: ICD-10-CM

## 2024-04-08 LAB
ABO + RH BLD: NORMAL
BASOPHILS # BLD AUTO: 0.03 K/UL (ref 0–0.2)
BASOPHILS NFR BLD: 0.3 % (ref 0–1.9)
BLD GP AB SCN CELLS X3 SERPL QL: NORMAL
DIFFERENTIAL METHOD BLD: ABNORMAL
EOSINOPHIL # BLD AUTO: 0.1 K/UL (ref 0–0.5)
EOSINOPHIL NFR BLD: 0.6 % (ref 0–8)
ERYTHROCYTE [DISTWIDTH] IN BLOOD BY AUTOMATED COUNT: 13.1 % (ref 11.5–14.5)
HCT VFR BLD AUTO: 39.2 % (ref 37–48.5)
HGB BLD-MCNC: 12.7 G/DL (ref 12–16)
IMM GRANULOCYTES # BLD AUTO: 0.07 K/UL (ref 0–0.04)
IMM GRANULOCYTES NFR BLD AUTO: 0.7 % (ref 0–0.5)
LYMPHOCYTES # BLD AUTO: 2.2 K/UL (ref 1–4.8)
LYMPHOCYTES NFR BLD: 21.3 % (ref 18–48)
MCH RBC QN AUTO: 30 PG (ref 27–31)
MCHC RBC AUTO-ENTMCNC: 32.4 G/DL (ref 32–36)
MCV RBC AUTO: 93 FL (ref 82–98)
MONOCYTES # BLD AUTO: 0.6 K/UL (ref 0.3–1)
MONOCYTES NFR BLD: 5.5 % (ref 4–15)
NEUTROPHILS # BLD AUTO: 7.5 K/UL (ref 1.8–7.7)
NEUTROPHILS NFR BLD: 71.6 % (ref 38–73)
NRBC BLD-RTO: 0 /100 WBC
PLATELET # BLD AUTO: 208 K/UL (ref 150–450)
PMV BLD AUTO: 10.2 FL (ref 9.2–12.9)
RBC # BLD AUTO: 4.24 M/UL (ref 4–5.4)
SPECIMEN OUTDATE: NORMAL
WBC # BLD AUTO: 10.46 K/UL (ref 3.9–12.7)

## 2024-04-08 PROCEDURE — 86850 RBC ANTIBODY SCREEN: CPT

## 2024-04-08 PROCEDURE — 11000001 HC ACUTE MED/SURG PRIVATE ROOM

## 2024-04-08 PROCEDURE — 63600175 PHARM REV CODE 636 W HCPCS

## 2024-04-08 PROCEDURE — 59400 OBSTETRICAL CARE: CPT | Mod: ,,, | Performed by: ADVANCED PRACTICE MIDWIFE

## 2024-04-08 PROCEDURE — 99285 EMERGENCY DEPT VISIT HI MDM: CPT | Mod: 25

## 2024-04-08 PROCEDURE — 59025 FETAL NON-STRESS TEST: CPT | Mod: 26,,,

## 2024-04-08 PROCEDURE — 36415 COLL VENOUS BLD VENIPUNCTURE: CPT

## 2024-04-08 PROCEDURE — 59025 FETAL NON-STRESS TEST: CPT

## 2024-04-08 PROCEDURE — 96372 THER/PROPH/DIAG INJ SC/IM: CPT

## 2024-04-08 PROCEDURE — 72100002 HC LABOR CARE, 1ST 8 HOURS

## 2024-04-08 PROCEDURE — 99284 EMERGENCY DEPT VISIT MOD MDM: CPT | Mod: 25,,,

## 2024-04-08 PROCEDURE — 0UQMXZZ REPAIR VULVA, EXTERNAL APPROACH: ICD-10-PCS | Performed by: ADVANCED PRACTICE MIDWIFE

## 2024-04-08 PROCEDURE — 72200004 HC VAGINAL DELIVERY LEVEL I

## 2024-04-08 PROCEDURE — 4A0HXCZ MEASUREMENT OF PRODUCTS OF CONCEPTION, CARDIAC RATE, EXTERNAL APPROACH: ICD-10-PCS | Performed by: OBSTETRICS & GYNECOLOGY

## 2024-04-08 PROCEDURE — 85025 COMPLETE CBC W/AUTO DIFF WBC: CPT

## 2024-04-08 PROCEDURE — 10907ZC DRAINAGE OF AMNIOTIC FLUID, THERAPEUTIC FROM PRODUCTS OF CONCEPTION, VIA NATURAL OR ARTIFICIAL OPENING: ICD-10-PCS | Performed by: ADVANCED PRACTICE MIDWIFE

## 2024-04-08 RX ORDER — MISOPROSTOL 200 UG/1
800 TABLET ORAL ONCE AS NEEDED
Status: DISCONTINUED | OUTPATIENT
Start: 2024-04-08 | End: 2024-04-09 | Stop reason: HOSPADM

## 2024-04-08 RX ORDER — OXYTOCIN/RINGER'S LACTATE 30/500 ML
334 PLASTIC BAG, INJECTION (ML) INTRAVENOUS ONCE
Status: DISCONTINUED | OUTPATIENT
Start: 2024-04-08 | End: 2024-04-09 | Stop reason: HOSPADM

## 2024-04-08 RX ORDER — ONDANSETRON 8 MG/1
8 TABLET, ORALLY DISINTEGRATING ORAL EVERY 8 HOURS PRN
Status: DISCONTINUED | OUTPATIENT
Start: 2024-04-08 | End: 2024-04-09 | Stop reason: HOSPADM

## 2024-04-08 RX ORDER — PRENATAL WITH FERROUS FUM AND FOLIC ACID 3080; 920; 120; 400; 22; 1.84; 3; 20; 10; 1; 12; 200; 27; 25; 2 [IU]/1; [IU]/1; MG/1; [IU]/1; MG/1; MG/1; MG/1; MG/1; MG/1; MG/1; UG/1; MG/1; MG/1; MG/1; MG/1
1 TABLET ORAL DAILY
Status: DISCONTINUED | OUTPATIENT
Start: 2024-04-08 | End: 2024-04-09 | Stop reason: HOSPADM

## 2024-04-08 RX ORDER — TRANEXAMIC ACID 10 MG/ML
1000 INJECTION, SOLUTION INTRAVENOUS EVERY 30 MIN PRN
Status: DISCONTINUED | OUTPATIENT
Start: 2024-04-08 | End: 2024-04-09 | Stop reason: HOSPADM

## 2024-04-08 RX ORDER — HYDROCORTISONE 25 MG/G
CREAM TOPICAL 3 TIMES DAILY PRN
Status: DISCONTINUED | OUTPATIENT
Start: 2024-04-08 | End: 2024-04-09 | Stop reason: HOSPADM

## 2024-04-08 RX ORDER — ACETAMINOPHEN 325 MG/1
650 TABLET ORAL EVERY 6 HOURS PRN
Status: DISCONTINUED | OUTPATIENT
Start: 2024-04-08 | End: 2024-04-09 | Stop reason: HOSPADM

## 2024-04-08 RX ORDER — METHYLERGONOVINE MALEATE 0.2 MG/ML
200 INJECTION INTRAVENOUS ONCE AS NEEDED
Status: COMPLETED | OUTPATIENT
Start: 2024-04-08 | End: 2024-04-08

## 2024-04-08 RX ORDER — DOCUSATE SODIUM 100 MG/1
200 CAPSULE, LIQUID FILLED ORAL 2 TIMES DAILY PRN
Status: DISCONTINUED | OUTPATIENT
Start: 2024-04-08 | End: 2024-04-09 | Stop reason: HOSPADM

## 2024-04-08 RX ORDER — SIMETHICONE 80 MG
1 TABLET,CHEWABLE ORAL EVERY 6 HOURS PRN
Status: DISCONTINUED | OUTPATIENT
Start: 2024-04-08 | End: 2024-04-09 | Stop reason: HOSPADM

## 2024-04-08 RX ORDER — OXYTOCIN/RINGER'S LACTATE 30/500 ML
334 PLASTIC BAG, INJECTION (ML) INTRAVENOUS ONCE AS NEEDED
Status: DISCONTINUED | OUTPATIENT
Start: 2024-04-08 | End: 2024-04-09 | Stop reason: HOSPADM

## 2024-04-08 RX ORDER — SODIUM CHLORIDE 0.9 % (FLUSH) 0.9 %
2 SYRINGE (ML) INJECTION EVERY 8 HOURS PRN
Status: DISCONTINUED | OUTPATIENT
Start: 2024-04-08 | End: 2024-04-09 | Stop reason: HOSPADM

## 2024-04-08 RX ORDER — DIPHENOXYLATE HYDROCHLORIDE AND ATROPINE SULFATE 2.5; .025 MG/1; MG/1
2 TABLET ORAL EVERY 6 HOURS PRN
Status: DISCONTINUED | OUTPATIENT
Start: 2024-04-08 | End: 2024-04-09 | Stop reason: HOSPADM

## 2024-04-08 RX ORDER — CARBOPROST TROMETHAMINE 250 UG/ML
250 INJECTION, SOLUTION INTRAMUSCULAR
Status: DISCONTINUED | OUTPATIENT
Start: 2024-04-08 | End: 2024-04-09 | Stop reason: HOSPADM

## 2024-04-08 RX ORDER — SODIUM CHLORIDE 9 MG/ML
INJECTION, SOLUTION INTRAVENOUS
Status: DISCONTINUED | OUTPATIENT
Start: 2024-04-08 | End: 2024-04-09 | Stop reason: HOSPADM

## 2024-04-08 RX ORDER — METHYLERGONOVINE MALEATE 0.2 MG/ML
200 INJECTION INTRAVENOUS ONCE AS NEEDED
Status: DISCONTINUED | OUTPATIENT
Start: 2024-04-08 | End: 2024-04-09 | Stop reason: HOSPADM

## 2024-04-08 RX ORDER — SIMETHICONE 80 MG
1 TABLET,CHEWABLE ORAL 4 TIMES DAILY PRN
Status: DISCONTINUED | OUTPATIENT
Start: 2024-04-08 | End: 2024-04-09 | Stop reason: HOSPADM

## 2024-04-08 RX ORDER — CALCIUM CARBONATE 200(500)MG
500 TABLET,CHEWABLE ORAL 3 TIMES DAILY PRN
Status: DISCONTINUED | OUTPATIENT
Start: 2024-04-08 | End: 2024-04-09 | Stop reason: HOSPADM

## 2024-04-08 RX ORDER — OXYTOCIN/RINGER'S LACTATE 30/500 ML
95 PLASTIC BAG, INJECTION (ML) INTRAVENOUS ONCE
Status: DISCONTINUED | OUTPATIENT
Start: 2024-04-08 | End: 2024-04-09 | Stop reason: HOSPADM

## 2024-04-08 RX ORDER — IBUPROFEN 600 MG/1
600 TABLET ORAL EVERY 6 HOURS
Status: DISCONTINUED | OUTPATIENT
Start: 2024-04-08 | End: 2024-04-09 | Stop reason: HOSPADM

## 2024-04-08 RX ORDER — OXYTOCIN/RINGER'S LACTATE 30/500 ML
334 PLASTIC BAG, INJECTION (ML) INTRAVENOUS ONCE AS NEEDED
Status: COMPLETED | OUTPATIENT
Start: 2024-04-08 | End: 2024-04-08

## 2024-04-08 RX ORDER — LIDOCAINE HYDROCHLORIDE 10 MG/ML
10 INJECTION INFILTRATION; PERINEURAL ONCE AS NEEDED
Status: DISCONTINUED | OUTPATIENT
Start: 2024-04-08 | End: 2024-04-09 | Stop reason: HOSPADM

## 2024-04-08 RX ORDER — OXYTOCIN/RINGER'S LACTATE 30/500 ML
95 PLASTIC BAG, INJECTION (ML) INTRAVENOUS ONCE AS NEEDED
Status: DISCONTINUED | OUTPATIENT
Start: 2024-04-08 | End: 2024-04-09 | Stop reason: HOSPADM

## 2024-04-08 RX ORDER — OXYTOCIN 10 [USP'U]/ML
10 INJECTION, SOLUTION INTRAMUSCULAR; INTRAVENOUS ONCE AS NEEDED
Status: DISCONTINUED | OUTPATIENT
Start: 2024-04-08 | End: 2024-04-09 | Stop reason: HOSPADM

## 2024-04-08 RX ORDER — DIPHENHYDRAMINE HCL 25 MG
25 CAPSULE ORAL EVERY 4 HOURS PRN
Status: DISCONTINUED | OUTPATIENT
Start: 2024-04-08 | End: 2024-04-09 | Stop reason: HOSPADM

## 2024-04-08 RX ORDER — SODIUM CHLORIDE, SODIUM LACTATE, POTASSIUM CHLORIDE, CALCIUM CHLORIDE 600; 310; 30; 20 MG/100ML; MG/100ML; MG/100ML; MG/100ML
INJECTION, SOLUTION INTRAVENOUS CONTINUOUS
Status: DISCONTINUED | OUTPATIENT
Start: 2024-04-08 | End: 2024-04-09 | Stop reason: HOSPADM

## 2024-04-08 RX ADMIN — Medication 334 MILLI-UNITS/MIN: at 08:04

## 2024-04-08 RX ADMIN — METHYLERGONOVINE MALEATE 200 MCG: 0.2 INJECTION, SOLUTION INTRAMUSCULAR; INTRAVENOUS at 09:04

## 2024-04-08 NOTE — PROGRESS NOTES
"LABOR NOTE    S:  Pt in birthing tub breathing through contractions, using hopkins in both hands for distraction. Feeling intermittent pelvic pressure but less intensity with contractions Family at bedside and supportive. She desires AROM as she feels contractions have spaced out are are less intense.    O: /65   Pulse 68   Temp 98.1 °F (36.7 °C) (Oral)   Resp 18   Ht 5' 6" (1.676 m)   Wt 73.1 kg (161 lb 2.5 oz)   LMP 2023 (Exact Date)   SpO2 99%   Breastfeeding No   BMI 26.01 kg/m²     GENERAL: Calm and appropriate affect  NEURO: Alert, oriented, normal speech  ABDOMEN: Nontender, Fundus palpates soft between UC's.  FHT per doppler: 135, no decels. Cat 1  CTX: q 6-8 minutes  SVE: 8/90/0, AROM clear fluid. CTX every 3-4 min after AROM.      ASSESSMENT:   28 y.o.  IUP at 41w1d, FHT Cat 1    Patient Active Problem List   Diagnosis    Rubella non-immune status, antepartum    Pregnancy    Multigravida in third trimester    History of postpartum hemorrhage, currently pregnant in third trimester    Normal labor         PLAN:  Report given to JENNIFER Estrada CNM  Recheck 2 hours or PRN  Using hydrotherapy; desires waterbirth  Anticipate       "

## 2024-04-08 NOTE — SUBJECTIVE & OBJECTIVE
Obstetric HPI:  Patient reports Frequency: Every 5 minutes contractions, active fetal movement, No vaginal bleeding , No loss of fluid     This pregnancy has been complicated by RNI, history of PPH    OB History    Para Term  AB Living   2 1 1 0 0 1   SAB IAB Ectopic Multiple Live Births   0 0 0 0 1      # Outcome Date GA Lbr Frank/2nd Weight Sex Delivery Anes PTL Lv   2 Current            1 Term 20 41w1d  3.742 kg (8 lb 4 oz) F Vag-Spont None N AMADA      Name: LON MATUTE      Apgar1: 9  Apgar5: 9     Past Medical History:   Diagnosis Date    IUD Expulsion x 2 2019 (resulted in pregnancy),  10 months after placement (not a GOOD candidate for IUD)    LGSIL on Pap smear of cervix     repeat pap smear 2020 NORMAL    Third-stage postpartum hemorrhage 6/3/2020     History reviewed. No pertinent surgical history.    (Not in a hospital admission)      Review of patient's allergies indicates:   Allergen Reactions    Pistachio nut Hives and Swelling    Apple Hives, Itching, Palpitations and Swelling    Nuts [tree nut]         Family History       Problem Relation (Age of Onset)    Diabetes Maternal Grandmother          Tobacco Use    Smoking status: Never    Smokeless tobacco: Never   Substance and Sexual Activity    Alcohol use: Not Currently    Drug use: Never    Sexual activity: Yes     Partners: Male     Birth control/protection: None     Review of Systems   Constitutional:  Negative for fever.   Gastrointestinal:  Positive for abdominal pain.        Contractions    Genitourinary:  Negative for vaginal bleeding and vaginal discharge.      Objective:     Vital Signs (Most Recent):  Temp: 98 °F (36.7 °C) (24 0321)  Pulse: 63 (24 0321)  Resp: 16 (24 0321)  BP: 125/72 (24 0321)  SpO2: 98 % (24 0320) Vital Signs (24h Range):  Temp:  [98 °F (36.7 °C)] 98 °F (36.7 °C)  Pulse:  [63-75] 63  Resp:  [16] 16  SpO2:  [98 %] 98 %  BP: (125)/(72) 125/72     Weight:  73.1 kg (161 lb 2.5 oz)  Body mass index is 26.01 kg/m².    FHT: 130 Cat 1 (reassuring)  TOCO:  Q 2-3 minutes     Physical Exam:   Constitutional: She appears well-developed.    HENT:   Head: Normocephalic.    Eyes: Conjunctivae are normal.     Cardiovascular:  Normal rate.             Pulmonary/Chest: Effort normal. No respiratory distress.        Abdominal: There is no abdominal tenderness.   Contractions      Genitourinary:    Vagina normal.             Musculoskeletal: Normal range of motion.       Neurological: She is alert.    Skin: Skin is warm and dry.    Psychiatric: She has a normal mood and affect. Her behavior is normal. Judgment and thought content normal.        Cervix:  Dilation:  6  Effacement:  90  Station: -1  Presentation: Vertex     Significant Labs:  Lab Results   Component Value Date    GROUPTRH A POS 08/24/2023    HEPBSAG Non-reactive 08/24/2023    STREPBCULT No Group B Streptococcus isolated 03/06/2024       I have personallly reviewed all pertinent lab results from the last 24 hours.  Recent Lab Results       None

## 2024-04-08 NOTE — ED PROVIDER NOTES
Encounter Date: 2024       History     Chief Complaint   Patient presents with    Contractions     Chris Kraft is a 28 y.o. female  at 41w1d with Estimated Date of Delivery: 3/31/24 who presents to [facility] accompanied by Manuel. Expecting a girl!    Reports regular uterine contractions since midnight. They are now 5-6 minutes apart and increasing in intensity and duration.  moderate contractions, active fetal movement, No vaginal bleeding , No loss of fluid.     Last ate tacos at 6 PM, last drank water at bedside.     This pregnancy has been complicated by RNI and history of PPH.  Patient Active Problem List:     Rubella non-immune status, antepartum     Pregnancy     Multigravida in third trimester     History of postpartum hemorrhage, currently pregnant in third trimester     Presentation: Cephalic by SVE and BSUS  Estimated Fetal Weight: 8 lb    Birth Center Risk Assessment: 0- Meets birth center guidelines    CNM management in [facility]  CNM management on L&D  Consultation with OB to develop  plan of care  Collaborative CNM/OB management with delivery on L&D   4-   Permanent referral of care to MD          Review of patient's allergies indicates:   Allergen Reactions    Pistachio nut Hives and Swelling    Apple Hives, Itching, Palpitations and Swelling    Nuts [tree nut]      Past Medical History:   Diagnosis Date    IUD Expulsion x 2 2019 (resulted in pregnancy),  10 months after placement (not a GOOD candidate for IUD)    LGSIL on Pap smear of cervix 2019    repeat pap smear 2020 NORMAL    Third-stage postpartum hemorrhage 6/3/2020     History reviewed. No pertinent surgical history.  Family History   Problem Relation Age of Onset    Diabetes Maternal Grandmother      Social History     Tobacco Use    Smoking status: Never    Smokeless tobacco: Never   Substance Use Topics    Alcohol use: Not Currently    Drug use: Never     Review of Systems   Constitutional:  Negative for fever.    Gastrointestinal:  Positive for abdominal pain. Negative for nausea and vomiting.   Genitourinary:  Positive for vaginal discharge. Negative for vaginal bleeding.        Physiologic discharge       Physical Exam     Initial Vitals   BP Pulse Resp Temp SpO2   04/08/24 0321 04/08/24 0320 04/08/24 0321 04/08/24 0321 04/08/24 0320   125/72 75 16 98 °F (36.7 °C) 98 %      MAP       --                Physical Exam    Vitals reviewed.  Constitutional: She appears well-developed and well-nourished.   HENT:   Head: Normocephalic.   Eyes: Conjunctivae are normal.   Neck:   Normal range of motion.  Cardiovascular:  Normal rate.           Pulmonary/Chest: No respiratory distress.   Abdominal: Abdomen is soft.   Moderate ctx   Genitourinary:    Vagina normal.     Musculoskeletal:         General: Normal range of motion.      Cervical back: Normal range of motion.     Neurological: She is alert and oriented to person, place, and time.   Skin: Skin is warm and dry.   Psychiatric: She has a normal mood and affect. Her behavior is normal. Judgment and thought content normal.         ED Course   Obtain Fetal nonstress test (NST)    Date/Time: 4/8/2024 3:30 AM    Performed by: Genesis Lang CNM  Authorized by: Genesis Lang CNM    Nonstress Test:     Variability:  6-25 BPM    Decelerations:  None    Accelerations:  15 bpm    Acoustic Stimulator: No      Baseline:  130    Uterine Irritability: No      Contractions:  Regular    Contraction Frequency:  2-3  Biophysical Profile:     Nonstress Test Interpretation: reactive      Overall Impression:  Reassuring  Post-procedure:     Patient tolerance:  Patient tolerated the procedure well with no immediate complications    Labs Reviewed   CBC W/ AUTO DIFFERENTIAL   TYPE AND SCREEN LABOR & DELIVERY          Imaging Results    None          Medications   lactated ringers bolus 1,000 mL (has no administration in time range)   lactated ringers bolus 500 mL (has no administration in time  range)   lactated ringers infusion (has no administration in time range)   0.9%  NaCl infusion (has no administration in time range)   oxytocin 30 units in 500 mL lactated ringers infusion (non-titrating) (has no administration in time range)   oxytocin 30 units in 500 mL lactated ringers infusion (non-titrating) (has no administration in time range)   ondansetron disintegrating tablet 8 mg (has no administration in time range)   calcium carbonate 200 mg calcium (500 mg) chewable tablet 500 mg (has no administration in time range)   simethicone chewable tablet 80 mg (has no administration in time range)   LIDOcaine HCL 10 mg/ml (1%) injection 10 mL (has no administration in time range)   oxytocin 30 units in 500 mL lactated ringers infusion (non-titrating) (has no administration in time range)   oxytocin 30 units in 500 mL lactated ringers infusion (non-titrating) (has no administration in time range)   oxytocin injection 10 Units (has no administration in time range)   miSOPROStoL tablet 800 mcg (has no administration in time range)   miSOPROStoL tablet 800 mcg (has no administration in time range)   methylergonovine injection 200 mcg (has no administration in time range)   carboprost injection 250 mcg (has no administration in time range)   tranexamic acid in NaCl,iso-os IVPB 1,000 mg (has no administration in time range)   diphenoxylate-atropine 2.5-0.025 mg per tablet 2 tablet (has no administration in time range)     Medical Decision Making  /-1 @ 0320  Desires unmedicated birth in [facility]  CBC, T&S  PIV for history of PPH  Admit to [facility] for expectant management and anticipation of   IA per protocol  Dr. Jarvis notified and in agreement with plan of care.    Amount and/or Complexity of Data Reviewed  Labs: ordered.    Risk  OTC drugs.  Prescription drug management.                                      Clinical Impression:  Final diagnoses:  [O47.9] Uterine contractions during pregnancy  [O48.0, Z3A.41] 41  weeks gestation of pregnancy  [O80, Z37.9] Normal labor (Primary)          ED Disposition Condition    Send to JENY&Genesis Cisneros CNM  04/08/24 8851

## 2024-04-08 NOTE — TELEPHONE ENCOUNTER
Contractions starting at MN. Now every 6-7 mins x30 min. Encouraged to come to [facility] for eval. Patient verbalized understanding.

## 2024-04-08 NOTE — HPI
Chris Kraft is a 28 y.o. female  at 41w1d with Estimated Date of Delivery: 3/31/24 who presents to [facility] accompanied by Manuel. Expecting a girl!     Reports regular uterine contractions since midnight. They are now 5-6 minutes apart and increasing in intensity and duration.  moderate contractions, active fetal movement, No vaginal bleeding , No loss of fluid.      Last ate tacos at 6 PM, last drank water at bedside.      This pregnancy has been complicated by RNI and history of PPH.  Patient Active Problem List:     Rubella non-immune status, antepartum     Pregnancy     Multigravida in third trimester     History of postpartum hemorrhage, currently pregnant in third trimester     Presentation:   Cephalic by SVE and BSUS  Estimated Fetal Weight: 8 lb     Birth Center Risk Assessment: 0- Meets birth center guidelines     CNM management in [facility]  CNM management on L&D  Consultation with OB to develop  plan of care  Collaborative CNM/OB management with delivery on L&D              4-   Permanent referral of care to MD

## 2024-04-08 NOTE — PROGRESS NOTES
"LABOR NOTE    S:  Pt resting, endorses more intensity with contractions.  Manuel at bedside and supportive.     O: /62   Pulse 60   Temp 98.1 °F (36.7 °C) (Oral)   Resp 16   Ht 5' 6" (1.676 m)   Wt 73.1 kg (161 lb 2.5 oz)   LMP 2023 (Exact Date)   SpO2 99%   Breastfeeding No   BMI 26.01 kg/m²     GENERAL: Calm and appropriate affect  NEURO: Alert, oriented, normal speech  ABDOMEN: Nontender, Fundus palpates soft between UC's.  FHT per doppler: 135, no decels. Cat 1  CTX: q 4-5 minutes  SVE: 7/90/-1, intact BOW, bloody show      ASSESSMENT:   28 y.o.  IUP at 41w1d, FHT Cat 1    Patient Active Problem List   Diagnosis    Rubella non-immune status, antepartum    Pregnancy    Multigravida in third trimester    History of postpartum hemorrhage, currently pregnant in third trimester    Normal labor         PLAN:  Continue expectant management  Hydrotherapy PRN  Recheck 2 hours or PRN  Anticipate   "

## 2024-04-08 NOTE — L&D DELIVERY NOTE
Progressed well following AROM of clear fluid. C/C/+2 at 0743. Excellent maternal expulsive effort. Desired to get out of tub at 0822. Continued to push well. Fhts remained 120s-130s during intermittent auscultation while pushing. OA to ROT. Shoulders and corpus followed easily with next push at 0845 over intact perineum. Infant girl. Bulb suction then to maternal abdomen with warm towels. Cord clamp x 2 and cut by Father after cessation of pulsation. Apgar Score of 8/9, respectively.  of intact harsha placenta at 0853.  Hemostasis achieved fundal massage and IV Pitocin, then IM Methergine x 1 dose for uterine atony. CAPO swept x 1 for small amount of clots. Mother and infant stable in room.  cc. Routine postpartum care initiated.  1200- Trickling blood from vagina noted despite firm fundus. A right periurethral laceration noted to be actively bleeding. 10 cc of 1% Lidocaine infiltrated locally. Laceration repaired using 2.0 Chromic on CT. Tolerated very well. Fundus remains firm. Assisted up to void and returned to bed. Ambulated with ease. Stable and doing well.     Jaymie Estrada CNM

## 2024-04-08 NOTE — HOSPITAL COURSE
/-1 @ 0320  Desires unmedicated birth in [facility]  CBC, T&S  PIV for history of PPH  Admit to [facility] for expectant management and anticipation of   IA per protocol  Dr. Jarvis notified and in agreement with plan of care.    24 PPD#1 doing well, normal involution, denies s/s of infection, discharge today

## 2024-04-08 NOTE — PLAN OF CARE
VSS. Pt reports pain is well controlled without scheduled pain medications. Breastfeeding independently, declines lactation assistance. Pt reports light lochia rubra. Voiding spontaneously with adequate output and ambulating without difficulty. No concerns at this time.

## 2024-04-08 NOTE — H&P
[facility] Emergency Dept (Obstetrics)  Obstetrics  History & Physical    Patient Name: Ariadna Kraft  MRN: [id number]  Admission Date: 2024  Primary Care Provider: Rehana, Primary Doctor    Subjective:     Principal Problem:Normal labor    History of Present Illness:  Ariadna Kraft is a 28 y.o. female  at 41w1d with Estimated Date of Delivery: 3/31/24 who presents to [facility] accompanied by Manuel. Expecting a girl!     Reports regular uterine contractions since midnight. They are now 5-6 minutes apart and increasing in intensity and duration.  moderate contractions, active fetal movement, No vaginal bleeding , No loss of fluid.      Last ate tacos at 6 PM, last drank water at bedside.      This pregnancy has been complicated by RNI and history of PPH.  Patient Active Problem List:     Rubella non-immune status, antepartum     Pregnancy     Multigravida in third trimester     History of postpartum hemorrhage, currently pregnant in third trimester     Presentation:   Cephalic by SVE and BSUS  Estimated Fetal Weight: 8 lb     Birth Center Risk Assessment: 0- Meets birth center guidelines     CNM management in [facility]  CNM management on L&D  Consultation with OB to develop  plan of care  Collaborative CNM/OB management with delivery on L&D              4-   Permanent referral of care to MD    Obstetric HPI:  Patient reports Frequency: Every 5 minutes contractions, active fetal movement, No vaginal bleeding , No loss of fluid     This pregnancy has been complicated by RNI, history of PPH    OB History    Para Term  AB Living   2 1 1 0 0 1   SAB IAB Ectopic Multiple Live Births   0 0 0 0 1      # Outcome Date GA Lbr Frank/2nd Weight Sex Delivery Anes PTL Lv   2 Current            1 Term 20 41w1d  3.742 kg (8 lb 4 oz) F Vag-Spont None N AMADA      Name: GIOVANI,GIRL ARIADNA      Apgar1: 9  Apgar5: 9     Past Medical History:   Diagnosis Date    IUD Expulsion x 2 2019 (resulted in pregnancy),  2021 10 months after placement (not a GOOD candidate for IUD)    LGSIL on Pap smear of cervix 2019    repeat pap smear 7/2020 NORMAL    Third-stage postpartum hemorrhage 6/3/2020     History reviewed. No pertinent surgical history.    (Not in a hospital admission)      Review of patient's allergies indicates:   Allergen Reactions    Pistachio nut Hives and Swelling    Apple Hives, Itching, Palpitations and Swelling    Nuts [tree nut]         Family History       Problem Relation (Age of Onset)    Diabetes Maternal Grandmother          Tobacco Use    Smoking status: Never    Smokeless tobacco: Never   Substance and Sexual Activity    Alcohol use: Not Currently    Drug use: Never    Sexual activity: Yes     Partners: Male     Birth control/protection: None     Review of Systems   Constitutional:  Negative for fever.   Gastrointestinal:  Positive for abdominal pain.        Contractions    Genitourinary:  Negative for vaginal bleeding and vaginal discharge.      Objective:     Vital Signs (Most Recent):  Temp: 98 °F (36.7 °C) (04/08/24 0321)  Pulse: 63 (04/08/24 0321)  Resp: 16 (04/08/24 0321)  BP: 125/72 (04/08/24 0321)  SpO2: 98 % (04/08/24 0320) Vital Signs (24h Range):  Temp:  [98 °F (36.7 °C)] 98 °F (36.7 °C)  Pulse:  [63-75] 63  Resp:  [16] 16  SpO2:  [98 %] 98 %  BP: (125)/(72) 125/72     Weight: 73.1 kg (161 lb 2.5 oz)  Body mass index is 26.01 kg/m².    FHT: 130 Cat 1 (reassuring)  TOCO:  Q 2-3 minutes     Physical Exam:   Constitutional: She appears well-developed.    HENT:   Head: Normocephalic.    Eyes: Conjunctivae are normal.     Cardiovascular:  Normal rate.             Pulmonary/Chest: Effort normal. No respiratory distress.        Abdominal: There is no abdominal tenderness.   Contractions      Genitourinary:    Vagina normal.             Musculoskeletal: Normal range of motion.       Neurological: She is alert.    Skin: Skin is warm and dry.    Psychiatric: She has a normal mood and affect. Her  behavior is normal. Judgment and thought content normal.        Cervix:  Dilation:  6  Effacement:  90  Station: -1  Presentation: Vertex     Significant Labs:  Lab Results   Component Value Date    GROUPTRH A POS 2023    HEPBSAG Non-reactive 2023    STREPBCULT No Group B Streptococcus isolated 2024       I have personallly reviewed all pertinent lab results from the last 24 hours.  Recent Lab Results       None          Assessment/Plan:     28 y.o. female  at 41w1d for:    * Normal labor  Anticipate   Intact BOW  Expectant management  IA per protocol  Admit to [facility]    History of postpartum hemorrhage, currently pregnant in third trimester  PIV initiated  Patient agreeable to receiving IV pitocin after delivery of placenta    Rubella non-immune status, antepartum  Offer MMR PP        Genesis Lang CNM  Obstetrics  [facility] - Emergency Dept (Obstetrics)

## 2024-04-09 VITALS
RESPIRATION RATE: 16 BRPM | OXYGEN SATURATION: 98 % | HEIGHT: 66 IN | HEART RATE: 61 BPM | SYSTOLIC BLOOD PRESSURE: 112 MMHG | TEMPERATURE: 98 F | WEIGHT: 161.19 LBS | DIASTOLIC BLOOD PRESSURE: 66 MMHG | BODY MASS INDEX: 25.9 KG/M2

## 2024-04-09 PROBLEM — Z34.83 MULTIGRAVIDA IN THIRD TRIMESTER: Status: RESOLVED | Noted: 2024-02-19 | Resolved: 2024-04-09

## 2024-04-09 PROBLEM — Z34.90 PREGNANCY: Status: RESOLVED | Noted: 2023-11-08 | Resolved: 2024-04-09

## 2024-04-09 LAB
BASOPHILS # BLD AUTO: 0.02 K/UL (ref 0–0.2)
BASOPHILS NFR BLD: 0.1 % (ref 0–1.9)
DIFFERENTIAL METHOD BLD: ABNORMAL
EOSINOPHIL # BLD AUTO: 0 K/UL (ref 0–0.5)
EOSINOPHIL NFR BLD: 0.2 % (ref 0–8)
ERYTHROCYTE [DISTWIDTH] IN BLOOD BY AUTOMATED COUNT: 13.1 % (ref 11.5–14.5)
HCT VFR BLD AUTO: 35.1 % (ref 37–48.5)
HGB BLD-MCNC: 11.5 G/DL (ref 12–16)
IMM GRANULOCYTES # BLD AUTO: 0.08 K/UL (ref 0–0.04)
IMM GRANULOCYTES NFR BLD AUTO: 0.5 % (ref 0–0.5)
LYMPHOCYTES # BLD AUTO: 1.9 K/UL (ref 1–4.8)
LYMPHOCYTES NFR BLD: 12.1 % (ref 18–48)
MCH RBC QN AUTO: 29.9 PG (ref 27–31)
MCHC RBC AUTO-ENTMCNC: 32.8 G/DL (ref 32–36)
MCV RBC AUTO: 91 FL (ref 82–98)
MONOCYTES # BLD AUTO: 0.8 K/UL (ref 0.3–1)
MONOCYTES NFR BLD: 4.9 % (ref 4–15)
NEUTROPHILS # BLD AUTO: 12.8 K/UL (ref 1.8–7.7)
NEUTROPHILS NFR BLD: 82.2 % (ref 38–73)
NRBC BLD-RTO: 0 /100 WBC
PLATELET # BLD AUTO: 199 K/UL (ref 150–450)
PMV BLD AUTO: 10.6 FL (ref 9.2–12.9)
RBC # BLD AUTO: 3.84 M/UL (ref 4–5.4)
WBC # BLD AUTO: 15.53 K/UL (ref 3.9–12.7)

## 2024-04-09 PROCEDURE — 72100002 HC LABOR CARE, 1ST 8 HOURS

## 2024-04-09 PROCEDURE — 85025 COMPLETE CBC W/AUTO DIFF WBC: CPT | Performed by: ADVANCED PRACTICE MIDWIFE

## 2024-04-09 PROCEDURE — 36415 COLL VENOUS BLD VENIPUNCTURE: CPT | Performed by: ADVANCED PRACTICE MIDWIFE

## 2024-04-09 PROCEDURE — 72200004 HC VAGINAL DELIVERY LEVEL I

## 2024-04-09 RX ORDER — DOCUSATE SODIUM 100 MG/1
200 CAPSULE, LIQUID FILLED ORAL 2 TIMES DAILY PRN
Qty: 60 CAPSULE | Refills: 0 | Status: SHIPPED | OUTPATIENT
Start: 2024-04-09

## 2024-04-09 RX ORDER — IBUPROFEN 600 MG/1
600 TABLET ORAL EVERY 6 HOURS
Qty: 60 TABLET | Refills: 0 | Status: SHIPPED | OUTPATIENT
Start: 2024-04-09

## 2024-04-09 NOTE — SUBJECTIVE & OBJECTIVE
Interval History:     She is doing well this morning. She is tolerating a regular diet without nausea or vomiting. She is voiding spontaneously. She is ambulating. She has passed flatus, and has not a BM. Vaginal bleeding is mild. She denies fever or chills. Abdominal pain is mild and controlled with oral medications. She Is breastfeeding. She desires circumcision for her male baby: not applicable.    Objective:     Vital Signs (Most Recent):  Temp: 98 °F (36.7 °C) (04/08/24 2020)  Pulse: 63 (04/09/24 0020)  Resp: 17 (04/09/24 0020)  BP: 127/78 (04/09/24 0020)  SpO2: 99 % (04/09/24 0020) Vital Signs (24h Range):  Temp:  [97.7 °F (36.5 °C)-98.1 °F (36.7 °C)] 98 °F (36.7 °C)  Pulse:  [60-86] 63  Resp:  [17-18] 17  SpO2:  [97 %-99 %] 99 %  BP: (118-147)/(58-78) 127/78     Weight: 73.1 kg (161 lb 2.5 oz)  Body mass index is 26.01 kg/m².      Intake/Output Summary (Last 24 hours) at 4/9/2024 0725  Last data filed at 4/8/2024 1230  Gross per 24 hour   Intake 167.15 ml   Output 1080 ml   Net -912.85 ml         Significant Labs:  Lab Results   Component Value Date    GROUPTRH A POS 04/08/2024    HEPBSAG Non-reactive 08/24/2023    STREPBCULT No Group B Streptococcus isolated 03/06/2024     Recent Labs   Lab 04/09/24  0527   HGB 11.5*   HCT 35.1*       I have personallly reviewed all pertinent lab results from the last 24 hours.  Recent Lab Results         04/09/24  0527        Baso # 0.02       Basophil % 0.1       Differential Method Automated       Eos # 0.0       Eos % 0.2       Gran # (ANC) 12.8       Gran % 82.2       Hematocrit 35.1       Hemoglobin 11.5       Immature Grans (Abs) 0.08  Comment: Mild elevation in immature granulocytes is non specific and   can be seen in a variety of conditions including stress response,   acute inflammation, trauma and pregnancy. Correlation with other   laboratory and clinical findings is essential.         Immature Granulocytes 0.5       Lymph # 1.9       Lymph % 12.1       MCH  29.9       MCHC 32.8       MCV 91       Mono # 0.8       Mono % 4.9       MPV 10.6       nRBC 0       Platelet Count 199       RBC 3.84       RDW 13.1       WBC 15.53               Physical Exam:   Constitutional: She is oriented to person, place, and time. She appears well-developed and well-nourished.    HENT:   Head: Normocephalic and atraumatic.    Eyes: Pupils are equal, round, and reactive to light. EOM are normal.      Pulmonary/Chest: Effort normal.        Abdominal: Soft. There is no abdominal tenderness.             Musculoskeletal: Normal range of motion.       Neurological: She is alert and oriented to person, place, and time.    Skin: Skin is warm and dry.    Psychiatric: She has a normal mood and affect. Her behavior is normal. Judgment and thought content normal.       Review of Systems   Constitutional:  Negative for fever.   Eyes:  Negative for visual disturbance.   Cardiovascular:  Negative for chest pain.   Gastrointestinal:  Positive for abdominal pain.        Cramping     Genitourinary:  Positive for vaginal bleeding. Negative for vaginal pain.        Mild lochia rubra     Neurological:  Negative for headaches.   All other systems reviewed and are negative.

## 2024-04-09 NOTE — LACTATION NOTE
Pt states that infant is breastfeeding well. Mild redness/tenderness to nipples. Lactation discharge teaching offered, but refused. Lactation team updated on plan of care.

## 2024-04-09 NOTE — DISCHARGE SUMMARY
[facility]  Obstetrics  Discharge Summary      Patient Name: Chris Kraft  MRN: [id number]  Admission Date: 2024  Hospital Length of Stay: 1 days  Discharge Date and Time:  2024 7:30 AM  Attending Physician: Gabbi Gu MD   Discharging Provider: Gera Lang CNM   Primary Care Provider: Rehana, Primary Doctor    HPI: PPD#1 s/p  of viable female infant in [facility]. Periurethral lac repaired under local. Placenta manually removed d/t excessive bleeding. Breastfeeding going well. Doing well and ready for discharge. Plan for partner vaasectomy PP.    * No surgery found *     Hospital Course:   /-1 @ 0320  Desires unmedicated birth in [facility]  CBC, T&S  PIV for history of PPH  Admit to [facility] for expectant management and anticipation of   IA per protocol  Dr. Jarvis notified and in agreement with plan of care.    24 PPD#1 doing well, normal involution, denies s/s of infection, discharge today     Consults (From admission, onward)          Status Ordering Provider     Inpatient consult to Anesthesiology  Once        Provider:  (Not yet assigned)    Acknowledged GERA LANG            Final Active Diagnoses:    Diagnosis Date Noted POA    PRINCIPAL PROBLEM:  Normal spontaneous vaginal delivery [O80] 2024 Not Applicable    Obstetrical laceration [O71.9] 2024 No    History of postpartum hemorrhage, currently pregnant in third trimester [O09.293] 2024 Not Applicable    Rubella non-immune status, antepartum [O09.899, Z28.39] 2023 Not Applicable      Problems Resolved During this Admission:        Significant Diagnostic Studies: Labs: All labs within the past 24 hours have been reviewed      Feeding Method: breast    Immunizations       Date Immunization Status Dose Route/Site Given by    24 1024 MMR Incomplete 0.5 mL Subcutaneous/     24 1024 Tdap Incomplete 0.5 mL Intramuscular/             Delivery:   "  Episiotomy: None   Lacerations: None   Repair suture:     Repair # of packets: 1   Blood loss (ml):       Birth information:  YOB: 2026   Time of birth: 8:45 AM   Sex: female   Delivery type: Vaginal, Spontaneous   Gestational Age: 41w1d     Measurements    Weight: 3800 g  Weight (lbs): 8 lb 6 oz  Length: 50.8 cm  Length (in): 20"  Head circumference: 36.8 cm  Chest circumference: 36.2 cm         Delivery Clinician: Delivery Providers    Delivering clinician: Jaymie Estrada CNM   Provider Role    Peggy Miguel Jennifer F., RN Kurtz, Paige N, RN              Additional  information:  Forceps:    Vacuum:    Breech:    Observed anomalies      Living?:     Apgars    Living status: Living  Apgar Component Scores:  1 min.:  5 min.:  10 min.:  15 min.:  20 min.:    Skin color:  0  1       Heart rate:  2  2       Reflex irritability:  2  2       Muscle tone:  2  2       Respiratory effort:  2  2       Total:  8  9       Apgars assigned by: PRIYANK ANGUIANO RN         Placenta: Delivered:       appearance  Pending Diagnostic Studies:       None            Discharged Condition: stable    Disposition: Home or Self Care    Follow Up:   Follow-up Information       [facility] Follow up in 6 week(s).    Specialty: Obstetrics and Gynecology  Why: Postpartum visit  Contact information:  [address] [facility] [facility] 4th Floor  [facility] [address] [phone]  Additional information:  [facility] [place] [facility] ([facility]) 4th Floor   Please park in [facility] and use [address] elevators                         Patient Instructions:      Diet Adult Regular     No driving until:   Order Comments: 1-2 weeks postpartum     Pelvic Rest   Order Comments: 6 weeks postpartum     Notify your health care provider if you experience any of the following:  temperature >100.4     Notify your health care provider if you " experience any of the following:  severe uncontrolled pain     Notify your health care provider if you experience any of the following:  redness, tenderness, or signs of infection (pain, swelling, redness, odor or green/yellow discharge around incision site)     Notify your health care provider if you experience any of the following:  difficulty breathing or increased cough     Notify your health care provider if you experience any of the following:  severe persistent headache     Notify your health care provider if you experience any of the following:  persistent dizziness, light-headedness, or visual disturbances     Notify your health care provider if you experience any of the following:  increased confusion or weakness     Activity as tolerated     Medications:  Current Discharge Medication List        START taking these medications    Details   docusate sodium (COLACE) 100 MG capsule Take 2 capsules (200 mg total) by mouth 2 (two) times daily as needed for Constipation.  Qty: 60 capsule, Refills: 0      ibuprofen (ADVIL,MOTRIN) 600 MG tablet Take 1 tablet (600 mg total) by mouth every 6 (six) hours.  Qty: 60 tablet, Refills: 0             Genesis Lang CNM  Obstetrics  [facility]

## 2024-04-09 NOTE — PLAN OF CARE
VSS. No pain medication needed this shift. Breastfeeding. Fundus firm and midline with light lochia rubra. Voiding spontaneously with adequate output. Passing gas. Refused MMR vaccine, education given. Discharge orders in per CNM. Mother/baby care guide, discharge instructions, & s/s of when to contact provider/return to [facility] reviewed, understanding verbalized. Pt to follow up at [facility] in 6 weeks for PP visit. No concerns at this time.

## 2024-04-09 NOTE — PLAN OF CARE
VSS. Patient had minimal to no pain during the night and did not want any pain medication. Patient breast feeding baby. Baby was cluster feeding during the night. Fundus firm and midline with light to moderate lochia rubra. Voiding spontaneously with adequate output. Repeat CBC this morning. Will continue to monitor the patient and intervene as necessary.            Problem:  Fall Injury Risk  Goal: Absence of Fall, Infant Drop and Related Injury  Outcome: Ongoing, Progressing     Problem: Adult Inpatient Plan of Care  Goal: Plan of Care Review  Outcome: Ongoing, Progressing  Goal: Patient-Specific Goal (Individualized)  Outcome: Ongoing, Progressing  Goal: Absence of Hospital-Acquired Illness or Injury  Outcome: Ongoing, Progressing  Goal: Optimal Comfort and Wellbeing  Outcome: Ongoing, Progressing  Goal: Readiness for Transition of Care  Outcome: Ongoing, Progressing     Problem: Infection  Goal: Absence of Infection Signs and Symptoms  Outcome: Ongoing, Progressing     Problem: Breastfeeding  Goal: Effective Breastfeeding  Outcome: Ongoing, Progressing     Problem: Adjustment to Role Transition (Postpartum Vaginal Delivery)  Goal: Successful Maternal Role Transition  Outcome: Ongoing, Progressing     Problem: Bleeding (Postpartum Vaginal Delivery)  Goal: Hemostasis  Outcome: Ongoing, Progressing     Problem: Infection (Postpartum Vaginal Delivery)  Goal: Absence of Infection Signs/Symptoms  Outcome: Ongoing, Progressing     Problem: Pain (Postpartum Vaginal Delivery)  Goal: Acceptable Pain Control  Outcome: Ongoing, Progressing     Problem: Urinary Retention (Postpartum Vaginal Delivery)  Goal: Effective Urinary Elimination  Outcome: Ongoing, Progressing

## 2024-04-09 NOTE — PROGRESS NOTES
[facility]  Obstetrics  Postpartum Progress Note    Patient Name: Chris Kraft  MRN: [id number]  Admission Date: 2024  Hospital Length of Stay: 1 days  Attending Physician: Gabbi Gu MD  Primary Care Provider: Rehana, Primary Doctor    Subjective:     Principal Problem:Normal spontaneous vaginal delivery    Hospital Course:  /-1 @ 0320  Desires unmedicated birth in [facility]  CBC, T&S  PIV for history of PPH  Admit to [facility] for expectant management and anticipation of   IA per protocol  Dr. Jarvis notified and in agreement with plan of care.    24 PPD#1 doing well, normal involution, denies s/s of infection, discharge today    Interval History:     She is doing well this morning. She is tolerating a regular diet without nausea or vomiting. She is voiding spontaneously. She is ambulating. She has passed flatus, and has not a BM. Vaginal bleeding is mild. She denies fever or chills. Abdominal pain is mild and controlled with oral medications. She Is breastfeeding. She desires circumcision for her male baby: not applicable.    Objective:     Vital Signs (Most Recent):  Temp: 98 °F (36.7 °C) (24)  Pulse: 63 (240)  Resp: 17 (24)  BP: 127/78 (240)  SpO2: 99 % (24) Vital Signs (24h Range):  Temp:  [97.7 °F (36.5 °C)-98.1 °F (36.7 °C)] 98 °F (36.7 °C)  Pulse:  [60-86] 63  Resp:  [17-18] 17  SpO2:  [97 %-99 %] 99 %  BP: (118-147)/(58-78) 127/78     Weight: 73.1 kg (161 lb 2.5 oz)  Body mass index is 26.01 kg/m².      Intake/Output Summary (Last 24 hours) at 2024 0751  Last data filed at 2024 1230  Gross per 24 hour   Intake 167.15 ml   Output 1080 ml   Net -912.85 ml         Significant Labs:  Lab Results   Component Value Date    GROUPTRH A POS 2024    HEPBSAG Non-reactive 2023    STREPBCULT No Group B Streptococcus isolated 2024     Recent Labs   Lab 24  0527   HGB 11.5*   HCT 35.1*       I  have personallly reviewed all pertinent lab results from the last 24 hours.  Recent Lab Results         24  0527        Baso # 0.02       Basophil % 0.1       Differential Method Automated       Eos # 0.0       Eos % 0.2       Gran # (ANC) 12.8       Gran % 82.2       Hematocrit 35.1       Hemoglobin 11.5       Immature Grans (Abs) 0.08  Comment: Mild elevation in immature granulocytes is non specific and   can be seen in a variety of conditions including stress response,   acute inflammation, trauma and pregnancy. Correlation with other   laboratory and clinical findings is essential.         Immature Granulocytes 0.5       Lymph # 1.9       Lymph % 12.1       MCH 29.9       MCHC 32.8       MCV 91       Mono # 0.8       Mono % 4.9       MPV 10.6       nRBC 0       Platelet Count 199       RBC 3.84       RDW 13.1       WBC 15.53               Physical Exam:   Constitutional: She is oriented to person, place, and time. She appears well-developed and well-nourished.    HENT:   Head: Normocephalic and atraumatic.    Eyes: Pupils are equal, round, and reactive to light. EOM are normal.      Pulmonary/Chest: Effort normal.        Abdominal: Soft. There is no abdominal tenderness.             Musculoskeletal: Normal range of motion.       Neurological: She is alert and oriented to person, place, and time.    Skin: Skin is warm and dry.    Psychiatric: She has a normal mood and affect. Her behavior is normal. Judgment and thought content normal.       Review of Systems   Constitutional:  Negative for fever.   Eyes:  Negative for visual disturbance.   Cardiovascular:  Negative for chest pain.   Gastrointestinal:  Positive for abdominal pain.        Cramping     Genitourinary:  Positive for vaginal bleeding. Negative for vaginal pain.        Mild lochia rubra     Neurological:  Negative for headaches.   All other systems reviewed and are negative.    Assessment/Plan:     28 y.o. female  for:    * Normal  spontaneous vaginal delivery  Routine postpartum care    Obstetrical laceration  Periurethral, repaired under local    History of postpartum hemorrhage, currently pregnant in third trimester  , periurethral lac, s/p manual removal of placental (no abx given) due to excessive bleeding    Rubella non-immune status, antepartum  Offer MMR PP        Disposition: As patient meets milestones, will plan to discharge today on PPD#1.    Genesis Lang CNM  Obstetrics  [facility] [place]

## 2024-04-14 ENCOUNTER — PATIENT MESSAGE (OUTPATIENT)
Dept: OBSTETRICS AND GYNECOLOGY | Facility: CLINIC | Age: 29
End: 2024-04-14
Payer: COMMERCIAL

## 2024-04-18 ENCOUNTER — PATIENT MESSAGE (OUTPATIENT)
Dept: OBSTETRICS AND GYNECOLOGY | Facility: CLINIC | Age: 29
End: 2024-04-18
Payer: COMMERCIAL

## 2024-04-18 NOTE — PHYSICIAN QUERY
Question: Please specify the diagnosis or diagnoses associated with the clinical findings from the query.    Provider Query Response:      Postpartum uterine atony without hemorrhage

## 2024-05-20 ENCOUNTER — POSTPARTUM VISIT (OUTPATIENT)
Dept: OBSTETRICS AND GYNECOLOGY | Facility: CLINIC | Age: 29
End: 2024-05-20
Payer: COMMERCIAL

## 2024-05-20 VITALS
BODY MASS INDEX: 22.11 KG/M2 | DIASTOLIC BLOOD PRESSURE: 68 MMHG | HEART RATE: 67 BPM | WEIGHT: 137 LBS | SYSTOLIC BLOOD PRESSURE: 118 MMHG

## 2024-05-20 DIAGNOSIS — Z12.4 CERVICAL CANCER SCREENING: ICD-10-CM

## 2024-05-20 PROCEDURE — 99999 PR PBB SHADOW E&M-EST. PATIENT-LVL III: CPT | Mod: PBBFAC,,,

## 2024-05-20 PROCEDURE — 87624 HPV HI-RISK TYP POOLED RSLT: CPT

## 2024-05-20 PROCEDURE — 0503F POSTPARTUM CARE VISIT: CPT | Mod: S$GLB,,,

## 2024-05-20 PROCEDURE — 88175 CYTOPATH C/V AUTO FLUID REDO: CPT

## 2024-05-20 NOTE — PROGRESS NOTES
Postpartum Visit  Chris Kraft is a 28 y.o. female  is here for a postpartum visit. She is 6 weeks postpartum following a spontaneous vaginal delivery of a female infant weighinlb 6oz, with Anesthesia: local. The delivery was at 41w 1d. Feeling well today without concerns.     OB History    Para Term  AB Living   2 2 2 0 0 2   SAB IAB Ectopic Multiple Live Births   0 0 0 0 2      # Outcome Date GA Lbr Frank/2nd Weight Sex Type Anes PTL Lv   2 Term 24 41w1d 06:43 / 01:02 3.8 kg (8 lb 6 oz) F Vag-Spont Local N AMADA   1 Term 20 41w1d  3.742 kg (8 lb 4 oz) F Vag-Spont None N AMADA       Pregnancy was complicated by:     Patient Active Problem List   Diagnosis    Rubella non-immune status, antepartum    History of postpartum hemorrhage, currently pregnant in third trimester    Normal spontaneous vaginal delivery    Obstetrical laceration    Postpartum uterine atony without hemorrhage    Abnormal uterine bleeding, postpartum       Postpartum course has been uncomplicated.    Bleeding no bleeding. Bowel/ bladder function is normal.   Her last pap was: 2020.    Patient is not yet sexually active.   Desired contraception method is vasectomy ().     Postpartum depression screening: negative. EPDS 1.    Baby's course has been uncomplicated. Baby is feeding by breast.     ROS:  GENERAL: No fever, chills, fatigability.  VULVAR: No pain, no lesions and no itching.  VAGINAL: No relaxation, no itching, no discharge, no abnormal bleeding and no lesions.  ABDOMEN: No abdominal pain. Denies nausea. Denies vomiting. No diarrhea. No constipation  BREAST: Denies pain. No lumps. No discharge.  URINARY: No incontinence, no nocturia, no frequency and no dysuria.  CARDIOVASCULAR: No chest pain. No shortness of breath. No leg cramps.  NEUROLOGICAL: No headaches. No vision changes.    Past Medical History:   Diagnosis Date    IUD Expulsion x 2 2019 (resulted in pregnancy),  10 months  after placement (not a GOOD candidate for IUD)    LGSIL on Pap smear of cervix 2019    repeat pap smear 7/2020 NORMAL    Third-stage postpartum hemorrhage 6/3/2020     No past surgical history on file.  Review of patient's allergies indicates:   Allergen Reactions    Pistachio nut Hives and Swelling    Apple Hives, Itching, Palpitations and Swelling    Nuts [tree nut]        Current Outpatient Medications:     docusate sodium (COLACE) 100 MG capsule, Take 2 capsules (200 mg total) by mouth 2 (two) times daily as needed for Constipation., Disp: 60 capsule, Rfl: 0    ibuprofen (ADVIL,MOTRIN) 600 MG tablet, Take 1 tablet (600 mg total) by mouth every 6 (six) hours., Disp: 60 tablet, Rfl: 0      Vitals:    05/20/24 1307   BP: 118/68   Pulse: 67       General appearance - alert, well appearing, and in no distress  Mental status - alert, oriented to person, place, and time  Skin - coloration normal for race, good turgor, warm to touch, no rashes  Abdomen - soft, nontender, nondistended, no masses or organomegaly    Pelvic -   External genitalia postpartum: normal, well-healed, without lesions or masses.  Normal female hair distribution. Adequate perineal body. Urethral meatus without lesions or prolapse. Urethra: no masses, tenderness, or scarring.  Bladder: without tenderness or masses.  Vaginal mucosa moist and pink, normal rugae, without lesions, abnormal discharge, or foul odor.  Cervix pink, no lesions, no cervical motion tenderness.  Uterus: midline, non tender, smooth, not enlarged, not prolapsed  No adnexal masses or tenderness.  Extremities - no edema, redness or tenderness in the calves or thighs      Chris was seen today for postpartum care.    Diagnoses and all orders for this visit:    Postpartum exam      Discussed contraception - Manuel (patient's ) got vasectomy.   Counseling regarding resuming normal activities of exercise and work.  Postpartum precautions reviewed  Pap smear with HR HPV testing  collected today.     Routine follow up in 1 yr    FIDEL Craig CNM

## 2024-05-27 LAB
FINAL PATHOLOGIC DIAGNOSIS: NORMAL
HPV HR 12 DNA SPEC QL NAA+PROBE: NEGATIVE
HPV16 AG SPEC QL: NEGATIVE
HPV18 DNA SPEC QL NAA+PROBE: NEGATIVE
Lab: NORMAL

## 2024-11-25 NOTE — PROGRESS NOTES
Detail Level: Detailed The patient location is: home  The chief complaint leading to consultation is: prenetal visit  Visit type: audiovisual  Total time spent with patient: 10 minutes  Each patient to whom he or she provides medical services by telemedicine is:  (1) informed of the relationship between the physician and patient and the respective role of any other health care provider with respect to management of the patient; and (2) notified that he or she may decline to receive medical services by telemedicine and may withdraw from such care at any time.    24 y.o. female  at 35w2d   Reports + FM, denies VB, LOF or CTX  Doing well without concerns.  TW lbs   Reviewed 28wk lab results (A POS)  Reviewed upcoming 36wk labs - orders placed  Reviewed warning signs, normal FKCs,  labor precautions and how/when to call.  RTC x 1 wks, call or present sooner prn.     Birth Center Risk Assessment: 0- Meets birth center guidelines    0- CNM management in ABC  1- CNM management on L&D  2- Consultation with OB to develop  plan of care  3- Collaborative CNM/OB management with delivery on L&D  4- Permanent referral of care to MD

## 2025-03-17 PROBLEM — O09.293 HISTORY OF POSTPARTUM HEMORRHAGE, CURRENTLY PREGNANT IN THIRD TRIMESTER: Status: RESOLVED | Noted: 2024-02-19 | Resolved: 2025-03-17

## 2025-06-17 ENCOUNTER — OFFICE VISIT (OUTPATIENT)
Dept: OBSTETRICS AND GYNECOLOGY | Facility: CLINIC | Age: 30
End: 2025-06-17
Payer: COMMERCIAL

## 2025-06-17 VITALS
TEMPERATURE: 98 F | SYSTOLIC BLOOD PRESSURE: 120 MMHG | HEART RATE: 71 BPM | OXYGEN SATURATION: 100 % | WEIGHT: 123.56 LBS | DIASTOLIC BLOOD PRESSURE: 66 MMHG | HEIGHT: 66 IN | BODY MASS INDEX: 19.86 KG/M2

## 2025-06-17 DIAGNOSIS — Z01.419 WELL WOMAN EXAM WITH ROUTINE GYNECOLOGICAL EXAM: Primary | ICD-10-CM

## 2025-06-17 DIAGNOSIS — Z12.4 NORMAL PAP SMEAR: ICD-10-CM

## 2025-06-17 PROCEDURE — 99999 PR PBB SHADOW E&M-EST. PATIENT-LVL III: CPT | Mod: PBBFAC,,,

## 2025-06-17 PROCEDURE — 99395 PREV VISIT EST AGE 18-39: CPT | Mod: S$GLB,,,

## 2025-06-17 NOTE — PROGRESS NOTES
"CC: Well woman exam    Chris Kraft is a 30 y.o. female  presents for well woman exam. No issues, problems, or complaints.    Pt does not desire STI screening today.     Last pap: 2024 - NILM with neg HPV      Past Medical History:   Diagnosis Date    IUD Expulsion x 2 2019 (resulted in pregnancy),  10 months after placement (not a GOOD candidate for IUD)    LGSIL on Pap smear of cervix     repeat pap smear 2020 NORMAL    Third-stage postpartum hemorrhage 6/3/2020     No past surgical history on file.  Social History[1]  Family History   Problem Relation Name Age of Onset    Diabetes Maternal Grandmother       OB History          2    Para   2    Term   2       0    AB   0    Living   2         SAB   0    IAB   0    Ectopic   0    Multiple   0    Live Births   2                 /66 (Patient Position: Sitting)   Pulse 71   Temp 98.1 °F (36.7 °C)   Ht 5' 6" (1.676 m)   Wt 56.1 kg (123 lb 9.1 oz)   SpO2 100%   BMI 19.94 kg/m²       ROS:  GENERAL: Denies weight gain or weight loss. Feeling well overall.   SKIN: Denies rash or lesions.   HEAD: Denies head injury or headache.   NODES: Denies enlarged lymph nodes.   CHEST: Denies chest pain or shortness of breath.   CARDIOVASCULAR: Denies palpitations or left sided chest pain.   ABDOMEN: No abdominal pain, constipation, diarrhea, nausea, vomiting or rectal bleeding.   URINARY: No frequency, dysuria, hematuria, or burning on urination.  REPRODUCTIVE: See HPI.   BREASTS: No concerns. Breastfeeding.  HEMATOLOGIC: No easy bruisability or excessive bleeding.   MUSCULOSKELETAL: Denies joint pain or swelling.   NEUROLOGIC: Denies syncope or weakness.     PHYSICAL EXAM:  APPEARANCE: Well nourished, well developed, in no acute distress.  AFFECT: Alert and oriented x 3  SKIN: Warm, dry, & intact. No acne or hirsutism.  NECK: Neck symmetric without masses or thyromegaly  NODES: No cervical, axillary, epitrochlear, inguinal, " or femoral lymph node enlargement  CHEST: Good respiratory effect.  ABDOMEN: Soft.  No tenderness or masses.  No hepatosplenomegaly.  No hernias.  BREASTS: Deferred  PELVIC: Normal external genitalia without lesions.  Normal hair distribution.  Adequate perineal body, normal urethral meatus.  Vagina moist and well rugated without lesions or discharge.  Cervix pink, without lesions, discharge or tenderness.  No significant cystocele or rectocele.    Bimanual exam shows uterus to be normal size, regular, mobile and nontender.  Adnexa without masses or tenderness.    EXTREMITIES: No edema.    ASSESSMENT/PLAN:  Well woman exam with routine gynecological exam    Pap NILM with negative HPV 05/02/2024  Comments:  Cotest due 05/2029      Patient was counseled today on A.C.S. Pap guidelines and recommendations for yearly pelvic exams, mammograms and monthly self breast exams; to see her PCP for other health maintenance.     Follow up in 1 year for annual or as needed.     Peggy Miguel CNM           [1]   Social History  Socioeconomic History    Marital status:    Tobacco Use    Smoking status: Never    Smokeless tobacco: Never   Substance and Sexual Activity    Alcohol use: Not Currently    Drug use: Never    Sexual activity: Yes     Partners: Male     Birth control/protection: None   Social History Narrative    ** Merged History Encounter **